# Patient Record
Sex: FEMALE | Race: WHITE | NOT HISPANIC OR LATINO | Employment: OTHER | URBAN - METROPOLITAN AREA
[De-identification: names, ages, dates, MRNs, and addresses within clinical notes are randomized per-mention and may not be internally consistent; named-entity substitution may affect disease eponyms.]

---

## 2017-02-06 ENCOUNTER — HISTORICAL (OUTPATIENT)
Dept: ADMINISTRATIVE | Facility: HOSPITAL | Age: 62
End: 2017-02-06

## 2017-04-04 ENCOUNTER — HISTORICAL (OUTPATIENT)
Dept: RADIOLOGY | Facility: HOSPITAL | Age: 62
End: 2017-04-04

## 2018-02-02 ENCOUNTER — HISTORICAL (OUTPATIENT)
Dept: ADMINISTRATIVE | Facility: HOSPITAL | Age: 63
End: 2018-02-02

## 2018-08-29 ENCOUNTER — TELEPHONE (OUTPATIENT)
Dept: GASTROENTEROLOGY | Facility: CLINIC | Age: 63
End: 2018-08-29

## 2018-08-29 NOTE — TELEPHONE ENCOUNTER
----- Message from Reina Jaramillo sent at 8/29/2018 10:55 AM CDT -----  Contact: self   sanjana - returning your call re her appts - did you get her records  - please call patient at

## 2018-08-29 NOTE — TELEPHONE ENCOUNTER
spoke with pt. haven't received records from  but will request today. Pt will also try and get the process going.

## 2018-08-31 ENCOUNTER — TELEPHONE (OUTPATIENT)
Dept: GASTROENTEROLOGY | Facility: CLINIC | Age: 63
End: 2018-08-31

## 2018-08-31 NOTE — TELEPHONE ENCOUNTER
----- Message from Reina Patelkert sent at 8/30/2018  1:45 PM CDT -----  Contact: self - [phone]  sanjana - did you receive her faxed medical records - please call patient at

## 2018-08-31 NOTE — TELEPHONE ENCOUNTER
Returned patient's call. Patient informed records received from Dr. Anne's office and Dr. Coburn's staff will contact her.    Patient thanked MA for call.

## 2018-09-20 ENCOUNTER — TELEPHONE (OUTPATIENT)
Dept: GASTROENTEROLOGY | Facility: CLINIC | Age: 63
End: 2018-09-20

## 2018-09-20 NOTE — TELEPHONE ENCOUNTER
Spoke with pt. Informed her that records are up for review by Pratibha Diallo NP. Will call back with appt time and date.

## 2018-09-20 NOTE — TELEPHONE ENCOUNTER
----- Message from Opal Correa sent at 9/19/2018  2:51 PM CDT -----  Contact: Self- [phone]  Almas- pt called to determine if she can be seen by Dr. Coburn for gerd and motility issues- please contact pt at [phone]

## 2018-10-09 ENCOUNTER — TELEPHONE (OUTPATIENT)
Dept: GASTROENTEROLOGY | Facility: CLINIC | Age: 63
End: 2018-10-09

## 2018-12-10 ENCOUNTER — TELEPHONE (OUTPATIENT)
Dept: GASTROENTEROLOGY | Facility: CLINIC | Age: 63
End: 2018-12-10

## 2018-12-10 ENCOUNTER — LAB VISIT (OUTPATIENT)
Dept: LAB | Facility: HOSPITAL | Age: 63
End: 2018-12-10
Payer: COMMERCIAL

## 2018-12-10 ENCOUNTER — OFFICE VISIT (OUTPATIENT)
Dept: GASTROENTEROLOGY | Facility: CLINIC | Age: 63
End: 2018-12-10
Payer: COMMERCIAL

## 2018-12-10 ENCOUNTER — TELEPHONE (OUTPATIENT)
Dept: ENDOSCOPY | Facility: HOSPITAL | Age: 63
End: 2018-12-10

## 2018-12-10 VITALS
BODY MASS INDEX: 29.06 KG/M2 | HEIGHT: 63 IN | WEIGHT: 164 LBS | HEART RATE: 74 BPM | DIASTOLIC BLOOD PRESSURE: 82 MMHG | SYSTOLIC BLOOD PRESSURE: 153 MMHG

## 2018-12-10 DIAGNOSIS — R10.84 ABDOMINAL PAIN, GENERALIZED: ICD-10-CM

## 2018-12-10 DIAGNOSIS — R14.0 BLOATING: ICD-10-CM

## 2018-12-10 DIAGNOSIS — K59.00 CONSTIPATION, UNSPECIFIED CONSTIPATION TYPE: ICD-10-CM

## 2018-12-10 DIAGNOSIS — K21.9 GASTROESOPHAGEAL REFLUX DISEASE WITHOUT ESOPHAGITIS: ICD-10-CM

## 2018-12-10 DIAGNOSIS — K59.00 CONSTIPATION, UNSPECIFIED CONSTIPATION TYPE: Primary | ICD-10-CM

## 2018-12-10 LAB
CREAT SERPL-MCNC: 0.7 MG/DL
EST. GFR  (AFRICAN AMERICAN): >60 ML/MIN/1.73 M^2
EST. GFR  (NON AFRICAN AMERICAN): >60 ML/MIN/1.73 M^2

## 2018-12-10 PROCEDURE — 99245 OFF/OP CONSLTJ NEW/EST HI 55: CPT | Mod: S$GLB,,, | Performed by: NURSE PRACTITIONER

## 2018-12-10 PROCEDURE — 82565 ASSAY OF CREATININE: CPT

## 2018-12-10 PROCEDURE — 81376 HLA II TYPING 1 LOCUS LR: CPT | Mod: 91

## 2018-12-10 PROCEDURE — 99999 PR PBB SHADOW E&M-EST. PATIENT-LVL V: CPT | Mod: PBBFAC,,, | Performed by: NURSE PRACTITIONER

## 2018-12-10 RX ORDER — AMITRIPTYLINE HYDROCHLORIDE 25 MG/1
TABLET, FILM COATED ORAL
Refills: 10 | COMMUNITY
Start: 2018-11-19

## 2018-12-10 RX ORDER — PANTOPRAZOLE SODIUM 40 MG/1
40 TABLET, DELAYED RELEASE ORAL
COMMUNITY
Start: 2017-07-10 | End: 2018-12-10

## 2018-12-10 RX ORDER — OMEPRAZOLE 40 MG/1
40 CAPSULE, DELAYED RELEASE ORAL
Qty: 180 CAPSULE | Refills: 3 | Status: SHIPPED | OUTPATIENT
Start: 2018-12-10 | End: 2019-02-27 | Stop reason: ALTCHOICE

## 2018-12-10 RX ORDER — ESTROGENS, CONJUGATED 0.62 MG/1
TABLET, FILM COATED ORAL
COMMUNITY
Start: 2018-11-12

## 2018-12-10 RX ORDER — LOSARTAN POTASSIUM AND HYDROCHLOROTHIAZIDE 25; 100 MG/1; MG/1
TABLET ORAL
COMMUNITY
Start: 2018-09-25 | End: 2019-09-17

## 2018-12-10 RX ORDER — VITAMIN E 268 MG
400 CAPSULE ORAL
COMMUNITY

## 2018-12-10 RX ORDER — CHLORPHENIRAMINE MALEATE 4 MG
4 TABLET ORAL EVERY 6 HOURS PRN
COMMUNITY

## 2018-12-10 RX ORDER — ATORVASTATIN CALCIUM 40 MG/1
40 TABLET, FILM COATED ORAL DAILY
COMMUNITY

## 2018-12-10 RX ORDER — FLUTICASONE PROPIONATE 50 MCG
1 SPRAY, SUSPENSION (ML) NASAL
COMMUNITY

## 2018-12-10 RX ORDER — MONTELUKAST SODIUM 10 MG/1
10 TABLET ORAL
COMMUNITY
Start: 2018-04-16

## 2018-12-10 NOTE — TELEPHONE ENCOUNTER
Please notes below....    Pratibha Diallo NP          12/10/18 12:07 PM   Note      GI procedures:     Day 1:  1st :anorectal manometry  2nd: MR defecography  3rd: EGD w/ BRAVO off PPI            Patient is scheduled for ARM on 1/21/19 at 1:30pm.  She can now be scheduled for MRI that same day after 3pm. (patient would like that afternoon on same day).    Her EGD/BRAVO off PPI is scheduled 2/14/19 in the first available BRAVO spot at 10:00am.    Thanks,  KORTNEY Velazquez

## 2018-12-10 NOTE — TELEPHONE ENCOUNTER
GI procedures:    Day 1:  1st :anorectal manometry  2nd: MR defecography  3rd: EGD w/ BRAVO off PPI

## 2018-12-10 NOTE — LETTER
December 11, 2018        Basil Anne MD  [address] [facility]  [address]  Gastroenetrology Clinic  [facility] [address]             [facility] Gastroenterology  [address]  [place] [address]  Phone: [phone]  Fax: [phone]   Patient: Joann López   MR Number: [id number]   YOB: 1955   Date of Visit: 12/10/2018       Dear Dr. Anne:    Thank you for referring Joann López to me for evaluation. Attached you will find relevant portions of my assessment and plan of care.    If you have questions, please do not hesitate to call me. I look forward to following Joann López along with you.    Sincerely,                  CC  No Recipients    Enclosure

## 2018-12-10 NOTE — PATIENT INSTRUCTIONS
Eliminate all forms of dairy/lactose (milk, cheese, butter, creamers, ice cream etc) and artificial sweeteners (splenda, equal, stevia, truvia, crystal lite, diet sodas, sugar free candy/gum etc) from your diet for 14 days to see if your symptoms improve.    For constipaiton: Stop herbal laxative and start trulance 3 mg daily. We have ordered an anorectal manometry and MRI defecography for further testing.    Start over the counter Iberogast for abdominal discomfort.     Stop pantoprazole and start omeprazole 40 mg twice daily for reflux. For further evaluation,we have ordered an EGD (upper endoscopy) with pH testing to be done off acid medication.

## 2018-12-10 NOTE — PROGRESS NOTES
[facility] Consultation Note    Reason for Consult:    Chief Complaint   Patient presents with    Heartburn    Abdominal Pain    Gas    Bloated    Constipation         PCP:   Primary Doctor No   [address] [facility] [address] GASTRO[facility] CL[facility]IC / [place]*    Referring MD:  Basil Anne Md  [address] [facility]  [address]  Gastroenetrology Clinic  [place] [address]      HPI:  Joann López is a 63 y.o. female with a PMH of asthma, allergies, HLD, HTN referred to motility clinic for second opinion regarding the following problems:    GERD.  Patient reports bothersome heartburn  Retrosternal pyrosis:yes  Regurgitation:yes  Belching:yes  Onset: 4 years  Frequency: daily  Improve with: no improvement with protonix 40 mg daily, zantac 150 mg 6 times daily, gas ex.  No improvement with prevacid, zegerid once daily in the past. nexium once daily lost efficacy. Dexilant 60 mg daily lost efficacy. Has not tried aciphex.      Upright symptoms: yes  Nocturnal symptoms: yes. Worse at night   Hoarseness:yes  Cough:no  Throat clearing:no  Food Triggers:no  Caffeine intake:1-2 cups coffee/day  Sleeps with head of the bed elevated.   Avoids eating prior to bedtime.     Dysphagia.  Reports difficulty swallowing.  Onset of symptoms:3 years ago  Problems with solids:yes  Problems with liquids:yes  Choking while eating:yes  Coughing while eating: yes  Location: upper esophagus  Frequency:  Was occurring 3-4 days weekly, until esophageal dilation (last dilation 2017)  Improves with:esophageal dilation. Usually lasts about 6 months.  History of food impactions requiring ED visit:no  History of allergies:no  History of seasonal allergies:yes  History of food allergies:wheat  History of eczema:no    Abdominal pain. Reports abdominal pain  Character:heavy, spasm  Location:epigastric, RUQ, RLQ  Frequency:  daily  Duration:several hours  Onset:3-4 years ago  Worse with:meals  Improves with:yoga, abd laser  treatments x 6-7 session per physical therapy, some improvement with librax in the past. IBgard lost efficacy. No improvement with elavil 12.5 mg HS x 3-4 months. Unable to tolerate 25 mg d/t worried it may have made constipation worse.  Associated with Bm: yes  Nocturnal pain: yes  Has not tried FDgard, Bentyl, Levsin, Levbid.  Antidepressants:celexa  Using narcotic pain medication: no   NSAIDs: no    Gas and bloating.   Bloating: yes  Excessive gas: yes  Abdominal distension: yes   Symptoms get worse after meals:yes  Symptoms get worse as the day progresses: sometimes   Consumes lactose:very little  Consumes artificial sugars:yes    Constipation.  Reports bothersome constipation:  Lumpy and hard, difficult to pass stools:yes. [place] type 1 off meds, type 4 on meds  Urgency and sensation of incomplete evacuation:yes  Straining during defecation:yes  Sensation of anorectal blockage:no  Rectal prolapse:no  Fewer than three spontaneous bowel movements per week: Once weekly if not on meds  Frequency:1 BM every other day  Symptoms started: reports IBS since having children (40 yrs ago), worse 4-5 years ago  Uses manual maneuvers to facilitate defecation:no    Problems in early childhood: no  History of perineal trauma: no    Previous evaluation: normal anorectal manometry  Previous pelvic muscle retraining: no    Unable to tolerate miralax due to bloating  Unable to tolerate Linzess 145 mcg x 1 year d/t diarrhea  No improvement with Amitiza 24 mcg BID  Some improvement with Trulance 3 mg daily at first, but then caused severe pressure in rectum, but worked better than linzess.  Some improvement  with dulcolax PRN in the past  Some improvement with Shaklee urbal laxative (contains senna) online 3 daily x 1 year  Has not tried lactulose, colace  Cannot recall efficacy of fiber     Anxiety. Depression. On celexa 20 mg daily with some improvement. Per pcp. Has not seen psych.    Mild Fatty liver on abd us.    Denies  dysphagia,  nausea, vomiting, early satiety,  diarrhea, constipation,  melena, weight loss,  insomnia.       Total visit time was 90 minutes, more than 50% of which was spent in face-to-face counseling with patient regarding symptoms, diagnostic results, prognosis, risks and benefits of treatment options, instructions for management, importance of compliance with chosen treatment options, risk factor reduction, stress reduction, coping strategies.      Previous Studies:   HIDA 7/3/18: nl EF 47%  Abd us limited 6/28/18:nl gallbladder. mild fatty liver.   Abd xray 2/2/18: nl  GES 7/18/17: initially slow but otherwise normal gastric emptying. The findings do not support gastroparesis. 86% ret at 1 hr, 67 % ret at 2 hrs, 32% ret at 3 hrs, 6% ret at 4 hrs.  * Anal manometry 6/1/17: Normal. Pt reports done at [facility]  Esophageal manometry 1/25/17: ineffective esophageal motility. frequent failed peristalsis w/ very large breaks. Min if any peristaltic activity in several swallows. IRP nl 4.5, DCI nl 777.1, 18% simultaneous contractions, 55% failed swallows  EGD 1/20/17: nl esophagus(min reflux changes) dilated with 56 fr bougie, gastric erythema (mild reactivity, mild chr gastritis). Nl duodenum (-).  Colon 6/18/15: FPTC. nl colon  EGD 5/21/15: nl esophagus (-) dilated w/ 50 fr easley, gastric erosions (erosive gastritis).   Per pt report, pH impedence 2015: + reflux correlated with s/s.  EGD 2/1/07: Nl esophagus. Gastric erythema (?). Nl duodenum (?).   Colon 2/1/07:no comment on prep. Complete to cecum. Nl colon  Flex sig 12/23/93: complete to mid TC. Melanosis coli.     Labs:  8/15/18: h. pylori stool Ag -  4/2017: CMP unremarkable, Vit D low 25, iron nl, b 12 nl, folate nl, TSH nl, CBC unremarkable  CARLOTTA +, speckled pattern, titer nl <1:40  scleroderma profile -  celiac disease panel -  IgA nl  T IgE high 367  IgG nl    *per referring provider note    ROS:  ROS   Constitutional: No fevers, + chills, + night  sweats, no weight loss  ENT: + congestion, no rhinorrhea, + chronic sinus problems  CV: No chest pain, no palpitations  Pulm: No cough, no shortness of breath  Ophtho: No blurry vision, no eye redness  GI: see HPI  Derm: No rash  Heme: No lymphadenopathy, no bruising  MSK: No joint pain, no joint swelling, no Raynauds  : No dysuria, no frequent urination, no blood in urine  Endo: No hot or cold intolerance  Neuro: No dizziness, no syncope, no seizure  Psych: No anxiety, no depression        Medical History:   Past Medical History:   Diagnosis Date    Asthma     Environmental allergies     HLD (hyperlipidemia)     HTN (hypertension)         Surgical History:   Past Surgical History:   Procedure Laterality Date     SECTION      OOPHORECTOMY      PARTIAL HYSTERECTOMY      TONSILLECTOMY      10 yrs ago        Family History:   Family History   Problem Relation Age of Onset    Celiac disease Mother     Irritable bowel syndrome Mother     Colon cancer Neg Hx     Crohn's disease Neg Hx     Esophageal cancer Neg Hx     Rectal cancer Neg Hx     Stomach cancer Neg Hx     Ulcerative colitis Neg Hx         Social History:   Social History     Socioeconomic History    Marital status: Unknown     Spouse name: None    Number of children: None    Years of education: None    Highest education level: None   Social Needs    Financial resource strain: None    Food insecurity - worry: None    Food insecurity - inability: None    Transportation needs - medical: None    Transportation needs - non-medical: None   Occupational History    None   Tobacco Use    Smoking status: Never Smoker    Smokeless tobacco: Never Used   Substance and Sexual Activity    Alcohol use: Yes     Comment: socially    Drug use: No    Sexual activity: None   Other Topics Concern    None   Social History Narrative    None        Review of patient's allergies indicates:  Allergies not on file    Current Outpatient  "Medications   Medication Sig Dispense Refill    amitriptyline (ELAVIL) 25 MG tablet TAKE ONE TABLET BY MOUTH AT BEDTIME AS DIRECTED  10    atorvastatin (LIPITOR) 40 MG tablet Take 40 mg by mouth once daily.      chlorpheniramine (CHLOR-TRIMETON) 4 mg tablet Take 4 mg by mouth every 6 (six) hours as needed for Allergies.      coQ10, ubiquinol, 200 mg Cap Take 200 mg by mouth.      fluticasone (FLONASE) 50 mcg/actuation nasal spray 1 spray by Nasal route.      losartan-hydrochlorothiazide 100-25 mg (HYZAAR) 100-25 mg per tablet       montelukast (SINGULAIR) 10 mg tablet Take 10 mg by mouth.      pantoprazole (PROTONIX) 40 MG tablet Take 40 mg by mouth.      PREMARIN 0.625 mg tablet       ranitidine (ZANTAC) 150 MG tablet TAKE ONE TABLET BY MOUTH EVERY EVENING      simethicone (GAS-X ORAL) Take by mouth.      UNABLE TO FIND medication name: Shaklee Herbal Laxative      UNABLE TO FIND medication name: Shaklee Probiotic Optiflora DI      UNABLE TO FIND medication name: Leslie Immunity NutriFuron      UNABLE TO FIND medication name: Shaklee Multi Vitamin      vitamin E 400 UNIT capsule Take 400 Units by mouth.      IBUPROFEN ORAL Take by mouth.       No current facility-administered medications for this visit.         Objective Findings:  Vital Signs:  BP (!) 153/82   Pulse 74   Ht 5' 3" (1.6 m)   Wt 74.4 kg (164 lb 0.4 oz)   BMI 29.06 kg/m²   Body mass index is 29.06 kg/m².    Physical Exam:  General appearance: alert, cooperative, no distress  HENT: Normocephalic, atraumatic, neck symmetrical, no nasal discharge  Eyes: conjunctivae/corneas clear, PERRL, EOM's intact  Lungs: clear to auscultation bilaterally, no dullness to percussion bilaterally  Heart: regular rate and rhythm without rub; no displacement of the PMI  Abdomen: soft, non-tender; bowel sounds normoactive; no organomegaly  Extremities: extremities symmetric; no clubbing, cyanosis, or edema  Integument: Skin color, texture, turgor " normal; no rashes; hair distrubution normal  Neurologic: Alert and oriented X 3, normal strength, normal coordination and gait  Psychiatric: no pressured speech; normal affect; no evidence of impaired cognition    RECTAL EXAM: possible mild dysfunction  Hemorrhoids: no  Tenderness: no  Skin tags: no  Fissure: no  Prolapse on [place]: no  No midline scar  Rectocele: none  SENSATION:  Normal sensation: yes  Anal reflex:normal  ANAL SPHINCTER   Normal resting tone: yes  Squeeze pressure: normal  BEAR DOWN:  Increased abdominal pressure: normal  Perineal descent: normal  Relaxation of EAS: no  Stool in volt: yes      Labs:  No results found for: WBC, HGB, HCT, MCV, PLT  No results found for: FERRITIN  No results found for: NA, K, CL, CO2, GLU, BUN, CREATININE, CALCIUM, PROT, ALBUMIN, BILITOT, ALKPHOS, AST, ALT  No results found for: TSH  No results found for: SEDRATE  No results found for: CRP  No results found for: LABA1C, HGBA1C        Assessment and Plan:  Joann López is a 63 y.o. female with a a PMH of asthma, allergies, HLD, HTN referred to motility clinic for second opinion regarding the following problems:    GERD.    No improvement with prevacid nor zegerid once daily in the past.   Nexium once daily lost efficacy.   Dexilant 60 mg daily lost efficacy.   No improvement with protonix 40 mg daily, zantac 150 mg 6 times daily, and gas ex PRN  -Stop protonix, only take zantac 150 mg twice daily  -Start omeprazole 40 mg BID  -EGD w E/g/d bx  W/ BRAVO off PPI  -Has not tried aciphex.          Dysphagia.  Esophageal manometry at [facility] with ineffective esophageal motility.  IgE elevated.    Resolved since last empiric esophageal dilation in 1/2017.  -Will consider repeat esophageal manometry if symptoms recur  -EGD w EoE bx     Abdominal pain. Associated with bowel movements. Nocturnal pain. Celiac panel negative.   On celexa  IBgard lost efficacy.   No improvement with elavil 12.5 mg HS. Unable to tolerate 25 mg  because it may have made constipation worse.  Some improvement with librax in the past.   Improves with yoga, abdominal laser treatments per physical therapy  -EGD  -Start Iberogast  -Consider increasing elavil as constipation improves  -Check HLA DQ 2/DQ8   -Has not tried FDgard, Bentyl, Levsin, Levbid.    Gas and bloating. w distention.   -Eliminate artificial sugars and lactose  -Will consider SIBO testing    Constipation.  PT reports IBS-C since having children (40 yrs ago), worse 4-5 years ago. Previous anorectal manometry normal. Rectal exam with evidence of pelvic floor dysfunction.   Cannot recall if fiber helped  Unable to tolerate miralax due to bloating  Unable to tolerate linzess 145 mcg due to diarrhea  No improvement with Amitiza 24 mcg daily  Some improvement  with dulcolax PRN in the past  Some improvement with Shaklee urbal laxative (senna)   Some improvement with trulance 3 mg daily, but caused rectal pressure.  -Anorectal manometry  -MR defecography  -Stop herbal laxative  -Start trulance 3 mg daily  -Will consider linzes 72mcg     Three female surgeries mentioned adhesions per Dr. Schwartz's note    Anxiety. Depression. On celexa 20 mg daily with some improvement. Per pcp. Has not seen psych.    Mild Fatty liver on abd us.    CARLOTTA+. Denies rheum sx     Follow-up in about 2 months (around 2/10/2019) for Motility w/ NAKUL Juarez, then Dr. Coburn in 4 months.    1. Constipation, unspecified constipation type    2. Gastroesophageal reflux disease without esophagitis    3. Abdominal pain, generalized    4. Bloating          Order summary:  Orders Placed This Encounter    MRI Pelvis With Contrast    Creatinine, serum    Case request GI: EGD (ESOPHAGOGASTRODUODENOSCOPY), PH MONITORING, ESOPHAGUS, WIRELESS, (OFF REFLUX MEDS)    Case request GI: MANOMETRY, ANORECTAL         Thank you so much for allowing me to participate in the care of Joann Diallo, APRN, FNP-C    I have  personally reviewed history, performed physical exam, and educated the patient.  I have reviewed and agree with today's findings and the care plan outlined by Pratibha Diallo NP.       Vivi Coburn MD

## 2018-12-11 ENCOUNTER — TELEPHONE (OUTPATIENT)
Dept: GASTROENTEROLOGY | Facility: CLINIC | Age: 63
End: 2018-12-11

## 2018-12-11 NOTE — TELEPHONE ENCOUNTER
----- Message from Lester Sadler MA sent at 12/11/2018  7:59 AM CST -----  Contact: self       ----- Message -----  From: Reina Jaramillo  Sent: 12/10/2018   1:08 PM  To: Sanjana GODFREY Staff    sanjana - returning your call re appts - please call patient at

## 2018-12-18 LAB
HLA CELIAC INTERPRETATION: ABNORMAL
HLA CELIAC SPECIMEN: ABNORMAL
HLA-DQ8 QL: NEGATIVE
HLA-DQA1*05:01 QL: POSITIVE
HLA-DQB1*02:01 QL: NEGATIVE

## 2018-12-19 ENCOUNTER — TELEPHONE (OUTPATIENT)
Dept: GASTROENTEROLOGY | Facility: CLINIC | Age: 63
End: 2018-12-19

## 2018-12-19 NOTE — TELEPHONE ENCOUNTER
Discussed celiac lab results with pt. Pt verbalized understanding. Pt thinks it is best she try gluten for the 8 weeks before having egd.

## 2019-01-06 ENCOUNTER — PATIENT MESSAGE (OUTPATIENT)
Dept: ADMINISTRATIVE | Facility: OTHER | Age: 64
End: 2019-01-06

## 2019-01-17 ENCOUNTER — TELEPHONE (OUTPATIENT)
Dept: GASTROENTEROLOGY | Facility: CLINIC | Age: 64
End: 2019-01-17

## 2019-01-17 NOTE — TELEPHONE ENCOUNTER
Spoke with pt and notified her that she is getting an MRI of her pelvis and that she needs to call radiology and ask them if her permeant eyeliner will affect her test.

## 2019-01-17 NOTE — TELEPHONE ENCOUNTER
"----- Message from Aileen Luis sent at 1/17/2019  2:40 PM CST -----  Contact: Patient   Patient has permanent eye liner and the notes on the MRI "What your doctor needs to know" says she should let the doctor know. She will also like to know if the Mri is for her esophagus       Callback: [phone]    Thank you   "

## 2019-01-21 ENCOUNTER — HOSPITAL ENCOUNTER (OUTPATIENT)
Facility: HOSPITAL | Age: 64
Discharge: HOME | End: 2019-01-21
Attending: INTERNAL MEDICINE | Admitting: INTERNAL MEDICINE
Payer: COMMERCIAL

## 2019-01-21 VITALS
OXYGEN SATURATION: 97 % | DIASTOLIC BLOOD PRESSURE: 72 MMHG | SYSTOLIC BLOOD PRESSURE: 160 MMHG | TEMPERATURE: 98 F | HEART RATE: 78 BPM | RESPIRATION RATE: 16 BRPM | HEIGHT: 63 IN | BODY MASS INDEX: 28.88 KG/M2 | WEIGHT: 163 LBS

## 2019-01-21 DIAGNOSIS — K59.00 CONSTIPATION: ICD-10-CM

## 2019-01-21 PROCEDURE — 91122 HC ANORECTAL MANOMETRY: CPT | Performed by: INTERNAL MEDICINE

## 2019-01-21 PROCEDURE — 91122 PR ANAL PRESSURE RECORD: CPT | Mod: 26,,, | Performed by: INTERNAL MEDICINE

## 2019-01-21 PROCEDURE — 91122 PR ANAL PRESSURE RECORD: ICD-10-PCS | Mod: 26,,, | Performed by: INTERNAL MEDICINE

## 2019-01-21 RX ORDER — CITALOPRAM 20 MG/1
20 TABLET, FILM COATED ORAL DAILY
COMMUNITY

## 2019-01-25 ENCOUNTER — TELEPHONE (OUTPATIENT)
Dept: GASTROENTEROLOGY | Facility: CLINIC | Age: 64
End: 2019-01-25

## 2019-01-25 NOTE — [employer] PATIENT INSTRUCTIONS
Discharge Summary/Instructions after an Endoscopic Procedure  Patient Name: Joann López  Patient MRN: [id number]  Patient YOB: 1955 Monday, January 21, 2019  Vivi Coburn MD  RESTRICTIONS:  During your procedure today, you received medications for sedation.  These   medications may affect your judgment, balance and coordination.  Therefore,   for 24 hours, you have the following restrictions:   - DO NOT drive a car, operate machinery, make legal/financial decisions,   sign important papers or drink alcohol.    ACTIVITY:  Today: no heavy lifting, straining or running due to procedural   sedation/anesthesia.  The following day: return to full activity including work.  DIET:  Eat and drink normally unless instructed otherwise.     TREATMENT FOR COMMON SIDE EFFECTS:  - Mild abdominal pain, nausea, belching, bloating or excessive gas:  rest,   eat lightly and use a heating pad.  - Sore Throat: treat with throat lozenges and/or gargle with warm salt   water.  - Because air was used during the procedure, expelling large amounts of air   from your rectum or belching is normal.  - If a bowel prep was taken, you may not have a bowel movement for 1-3 days.    This is normal.  SYMPTOMS TO WATCH FOR AND REPORT TO YOUR PHYSICIAN:  1. Abdominal pain or bloating, other than gas cramps.  2. Chest pain.  3. Back pain.  4. Signs of infection such as: chills or fever occurring within 24 hours   after the procedure.  5. Rectal bleeding, which would show as bright red, maroon, or black stools.   (A tablespoon of blood from the rectum is not serious, especially if   hemorrhoids are present.)  6. Vomiting.  7. Weakness or dizziness.  GO DIRECTLY TO THE NEAREST EMERGENCY ROOM IF YOU HAVE ANY OF THE FOLLOWING:      Difficulty breathing              Chills and/or fever over 101 F   Persistent vomiting and/or vomiting blood   Severe abdominal pain   Severe chest pain   Black, tarry stools   Bleeding- more than one  tablespoon   Any other symptom or condition that you feel may need urgent attention  Your doctor recommends these additional instructions:  If any biopsies were taken, your doctors clinic will contact you in 1 to 2   weeks with any results.  - Return to my office as previously scheduled.  For questions, problems or results please call your physician - Vivi Coburn MD at Work:  [phone].  [facility], EMERGENCY ROOM PHONE NUMBER: [phone]  IF A COMPLICATION OR EMERGENCY SITUATION ARISES AND YOU ARE UNABLE TO REACH   YOUR PHYSICIAN - GO DIRECTLY TO THE EMERGENCY ROOM.  Vivi Coburn MD  1/25/2019 5:35:08 PM  This report has been verified and signed electronically.  [employer]

## 2019-01-25 NOTE — TELEPHONE ENCOUNTER
Anorectal Manometry     High resting sphincter pressure  Adequate Squeeze  Dyssyngergic defecation Type IV   Hyposensitivity in the rectum  Patient was able to evacuate 50cc balloon.      Please let patient know that anorectal manometry shows:     Abnormal coordination of muscle during defecation   Hyposensensitivity of rectum       I would like to:   - Refer her to PT for biofeedback (the physical therapist will work with him/her to help improve the function of the rectum).  Pls arrange.  - needs to complete MR  Defecography.    Follow up with Dr. Coburn after all studies completed

## 2019-01-28 ENCOUNTER — HOSPITAL ENCOUNTER (OUTPATIENT)
Dept: RADIOLOGY | Facility: HOSPITAL | Age: 64
Discharge: HOME | End: 2019-01-28
Attending: NURSE PRACTITIONER
Payer: COMMERCIAL

## 2019-01-28 ENCOUNTER — TELEPHONE (OUTPATIENT)
Dept: GASTROENTEROLOGY | Facility: CLINIC | Age: 64
End: 2019-01-28

## 2019-01-28 ENCOUNTER — TELEPHONE (OUTPATIENT)
Dept: ENDOSCOPY | Facility: HOSPITAL | Age: 64
End: 2019-01-28

## 2019-01-28 DIAGNOSIS — K59.00 CONSTIPATION, UNSPECIFIED CONSTIPATION TYPE: ICD-10-CM

## 2019-01-28 DIAGNOSIS — M62.89 PELVIC FLOOR DYSFUNCTION: Primary | ICD-10-CM

## 2019-01-28 PROCEDURE — 72196 MRI PELVIS W/DYE: CPT | Mod: 26,,, | Performed by: RADIOLOGY

## 2019-01-28 PROCEDURE — 72196 MRI PELVIS W/DYE: CPT | Mod: TC

## 2019-01-28 PROCEDURE — 72196 MRI DEFECOGRAPHY: ICD-10-PCS | Mod: 26,,, | Performed by: RADIOLOGY

## 2019-01-28 NOTE — TELEPHONE ENCOUNTER
Pt states maybe the PT specialist is something that you can discuss w her because she feels that driving 3 hrs to come here is not ideal for her.

## 2019-01-28 NOTE — TELEPHONE ENCOUNTER
Pls offer to schedule a visit w PT on the same day as fu.  The pt specialist may be able to give us a recommendation for someone closer to her home.  Her ref Gi doctor may also know someone close to her house he/she may be able to ref to.

## 2019-01-28 NOTE — TELEPHONE ENCOUNTER
She should try to see one of our PT specialists that works on pelvic floor specifically.  Order placed

## 2019-01-28 NOTE — TELEPHONE ENCOUNTER
Anorectal manometry results given to pt w recommendations. Pt verbalized understanding. Pt asked that order be mailed to her to find local physical therapist. (I do not see the order.)    MR defecography completed today.    Pt wants to know if celiac labs should be repeated and if so , can the orders be placed to do same day as egd.

## 2019-01-28 NOTE — TELEPHONE ENCOUNTER
----- Message from Reina Jaramillo sent at 1/28/2019 10:32 AM CST -----  Contact: self   Needs Advice    Reason for call: returning your call re results        Communication Preference: [phone]    Additional Information:

## 2019-01-29 ENCOUNTER — TELEPHONE (OUTPATIENT)
Dept: GASTROENTEROLOGY | Facility: CLINIC | Age: 64
End: 2019-01-29

## 2019-01-29 NOTE — TELEPHONE ENCOUNTER
Attempted to contact PT here. Unable yo arrange same day as follow up due to their scheduling process. Informed that pt can communicate her preference to .    Informed pt of call and also of 's suggestions. Pt verbalized understanding.

## 2019-02-14 ENCOUNTER — ANESTHESIA EVENT (OUTPATIENT)
Dept: ENDOSCOPY | Facility: HOSPITAL | Age: 64
End: 2019-02-14
Payer: COMMERCIAL

## 2019-02-14 ENCOUNTER — ANESTHESIA (OUTPATIENT)
Dept: ENDOSCOPY | Facility: HOSPITAL | Age: 64
End: 2019-02-14
Payer: COMMERCIAL

## 2019-02-14 ENCOUNTER — HOSPITAL ENCOUNTER (OUTPATIENT)
Facility: HOSPITAL | Age: 64
Discharge: HOME | End: 2019-02-14
Attending: INTERNAL MEDICINE | Admitting: INTERNAL MEDICINE
Payer: COMMERCIAL

## 2019-02-14 VITALS
DIASTOLIC BLOOD PRESSURE: 60 MMHG | RESPIRATION RATE: 18 BRPM | TEMPERATURE: 98 F | SYSTOLIC BLOOD PRESSURE: 123 MMHG | HEIGHT: 63 IN | WEIGHT: 164 LBS | BODY MASS INDEX: 29.06 KG/M2 | HEART RATE: 65 BPM | OXYGEN SATURATION: 97 %

## 2019-02-14 DIAGNOSIS — K21.9 GERD (GASTROESOPHAGEAL REFLUX DISEASE): ICD-10-CM

## 2019-02-14 DIAGNOSIS — K21.9 GASTROESOPHAGEAL REFLUX DISEASE, ESOPHAGITIS PRESENCE NOT SPECIFIED: Primary | ICD-10-CM

## 2019-02-14 PROCEDURE — 88305 TISSUE EXAM BY PATHOLOGIST: CPT | Performed by: PATHOLOGY

## 2019-02-14 PROCEDURE — 37000008 HC ANESTHESIA 1ST 15 MINUTES: Performed by: INTERNAL MEDICINE

## 2019-02-14 PROCEDURE — 27202406 HC CATHETER (ANY), ENDO FUNCTNL LUM IMG PROBE TECH: Performed by: INTERNAL MEDICINE

## 2019-02-14 PROCEDURE — 63600175 PHARM REV CODE 636 W HCPCS: Performed by: NURSE ANESTHETIST, CERTIFIED REGISTERED

## 2019-02-14 PROCEDURE — 91040 ESOPH BALLOON DISTENSION TST: CPT | Performed by: INTERNAL MEDICINE

## 2019-02-14 PROCEDURE — 27200942: Performed by: INTERNAL MEDICINE

## 2019-02-14 PROCEDURE — 91035 G-ESOPH REFLX TST W/ELECTROD: CPT | Performed by: INTERNAL MEDICINE

## 2019-02-14 PROCEDURE — 25000003 PHARM REV CODE 250: Performed by: INTERNAL MEDICINE

## 2019-02-14 PROCEDURE — 91035 G-ESOPH REFLX TST W/ELECTROD: CPT | Mod: 26,,, | Performed by: INTERNAL MEDICINE

## 2019-02-14 PROCEDURE — E9220 PRA ENDO ANESTHESIA: ICD-10-PCS | Mod: ,,, | Performed by: NURSE ANESTHETIST, CERTIFIED REGISTERED

## 2019-02-14 PROCEDURE — 91035 PR GERD TST W/ MUCOS PH ELECTROD: ICD-10-PCS | Mod: 26,,, | Performed by: INTERNAL MEDICINE

## 2019-02-14 PROCEDURE — E9220 PRA ENDO ANESTHESIA: HCPCS | Mod: ,,, | Performed by: NURSE ANESTHETIST, CERTIFIED REGISTERED

## 2019-02-14 PROCEDURE — 91040 ESOPH BALLOON DISTENSION TST: CPT | Mod: 26,,, | Performed by: INTERNAL MEDICINE

## 2019-02-14 PROCEDURE — 91040 PR ESOPH BALLOON DISTENSION TST: ICD-10-PCS | Mod: 26,,, | Performed by: INTERNAL MEDICINE

## 2019-02-14 PROCEDURE — 43239 EGD BIOPSY SINGLE/MULTIPLE: CPT | Mod: ,,, | Performed by: INTERNAL MEDICINE

## 2019-02-14 PROCEDURE — 27201012 HC FORCEPS, HOT/COLD, DISP: Performed by: INTERNAL MEDICINE

## 2019-02-14 PROCEDURE — 88305 TISSUE SPECIMEN TO PATHOLOGY - SURGERY: ICD-10-PCS | Mod: 26,,, | Performed by: PATHOLOGY

## 2019-02-14 PROCEDURE — 37000009 HC ANESTHESIA EA ADD 15 MINS: Performed by: INTERNAL MEDICINE

## 2019-02-14 PROCEDURE — 43239 EGD BIOPSY SINGLE/MULTIPLE: CPT | Performed by: INTERNAL MEDICINE

## 2019-02-14 PROCEDURE — 88305 TISSUE EXAM BY PATHOLOGIST: CPT | Mod: 26,,, | Performed by: PATHOLOGY

## 2019-02-14 PROCEDURE — 43239 PR EGD, FLEX, W/BIOPSY, SGL/MULTI: ICD-10-PCS | Mod: ,,, | Performed by: INTERNAL MEDICINE

## 2019-02-14 RX ORDER — PROPOFOL 10 MG/ML
VIAL (ML) INTRAVENOUS CONTINUOUS PRN
Status: DISCONTINUED | OUTPATIENT
Start: 2019-02-14 | End: 2019-02-15

## 2019-02-14 RX ORDER — PROPOFOL 10 MG/ML
VIAL (ML) INTRAVENOUS
Status: DISCONTINUED | OUTPATIENT
Start: 2019-02-14 | End: 2019-02-15

## 2019-02-14 RX ORDER — SODIUM CHLORIDE 9 MG/ML
INJECTION, SOLUTION INTRAVENOUS CONTINUOUS
Status: DISCONTINUED | OUTPATIENT
Start: 2019-02-14 | End: 2019-02-14 | Stop reason: HOSPADM

## 2019-02-14 RX ORDER — LIDOCAINE HCL/PF 100 MG/5ML
SYRINGE (ML) INTRAVENOUS
Status: DISCONTINUED | OUTPATIENT
Start: 2019-02-14 | End: 2019-02-15

## 2019-02-14 RX ORDER — SODIUM CHLORIDE 0.9 % (FLUSH) 0.9 %
3 SYRINGE (ML) INJECTION
Status: DISCONTINUED | OUTPATIENT
Start: 2019-02-14 | End: 2019-02-14 | Stop reason: HOSPADM

## 2019-02-14 RX ADMIN — LIDOCAINE HYDROCHLORIDE 50 MG: 20 INJECTION, SOLUTION INTRAVENOUS at 10:02

## 2019-02-14 RX ADMIN — PROPOFOL 80 MG: 10 INJECTION, EMULSION INTRAVENOUS at 10:02

## 2019-02-14 RX ADMIN — SODIUM CHLORIDE: 0.9 INJECTION, SOLUTION INTRAVENOUS at 09:02

## 2019-02-14 RX ADMIN — PROPOFOL 200 MCG/KG/MIN: 10 INJECTION, EMULSION INTRAVENOUS at 10:02

## 2019-02-14 NOTE — ANESTHESIA PREPROCEDURE EVALUATION
02/14/2019  Joann López is a 63 y.o., female with GERD here for EGD and pH monitoring.    Past Medical History:   Diagnosis Date    Asthma     Environmental allergies     HLD (hyperlipidemia)     HTN (hypertension)      No results found for: WBC, HGB, HCT, MCV, PLT    Chemistry        Component Value Date/Time    CREATININE 0.7 12/10/2018 1300        Component Value Date/Time    ESTGFRAFRICA >60.0 12/10/2018 1300    EGFRNONAA >60.0 12/10/2018 1300            Anesthesia Evaluation    I have reviewed the Patient Summary Reports.     I have reviewed the Medications.     Review of Systems  Anesthesia Hx:  No problems with previous Anesthesia    Cardiovascular:   Exercise tolerance: good Hypertension Denies CABG/stent.   Denies Angina.    Pulmonary:   Denies Shortness of breath.    Hepatic/GI:   GERD, poorly controlled        Physical Exam  General:  Well nourished, Obesity    Airway/Jaw/Neck:  Airway Findings: Mouth Opening: Normal Tongue: Normal  General Airway Assessment: Adult  Mallampati: II  Jaw/Neck Findings:  Neck ROM: Normal ROM      Dental:  Dental Findings: In tact   Chest/Lungs:  Chest/Lungs Findings: Clear to auscultation, Normal Respiratory Rate     Heart/Vascular:  Heart Findings: Rate: Normal  Rhythm: Regular Rhythm  Sounds: Normal        Mental Status:  Mental Status Findings:  Cooperative, Alert and Oriented         Anesthesia Plan  Type of Anesthesia, risks & benefits discussed:  Anesthesia Type:  general  Patient's Preference:   Intra-op Monitoring Plan: standard ASA monitors  Intra-op Monitoring Plan Comments:   Post Op Pain Control Plan: multimodal analgesia  Post Op Pain Control Plan Comments:   Induction:   IV  Beta Blocker:         Informed Consent: Patient understands risks and agrees with Anesthesia plan.  Questions answered. Anesthesia consent signed with patient.  ASA Score: 2      Day of Surgery Review of History & Physical:    H&P update referred to the surgeon.         Ready For Surgery From Anesthesia Perspective.

## 2019-02-14 NOTE — DISCHARGE INSTRUCTIONS

## 2019-02-14 NOTE — H&P
Short Stay Endoscopy History and Physical    PCP - Primary Doctor No     Procedure - EGD  ASA - per anesthesia  Mallampati - per anesthesia  History of Anesthesia problems - no  Family history Anesthesia problems -  no   Plan of anesthesia - General    HPI:  This is a 63 y.o. female here for evaluation of gerd, dysphagia, abd pain:     ROS:  Constitutional: No fevers, chills, No weight loss  CV: No chest pain  Pulm: No cough, No shortness of breath  Ophtho: No vision changes  GI: see HPI  Derm: No rash    Medical History:  has a past medical history of Asthma, Environmental allergies, HLD (hyperlipidemia), and HTN (hypertension).    Surgical History:  has a past surgical history that includes Tonsillectomy; Partial hysterectomy;  section; Oophorectomy; Anorectal manometry (N/A, 2019); and Hysterectomy.    Family History: family history includes Celiac disease in her mother; Irritable bowel syndrome in her mother.. Otherwise no colon cancer, inflammatory bowel disease, or GI malignancies.    Social History:  reports that  has never smoked. she has never used smokeless tobacco. She reports that she drinks alcohol. She reports that she does not use drugs.    Review of patient's allergies indicates:  No Known Allergies    Medications:   Medications Prior to Admission   Medication Sig Dispense Refill Last Dose    amitriptyline (ELAVIL) 25 MG tablet TAKE ONE TABLET BY MOUTH AT BEDTIME AS DIRECTED  10 2019 at Unknown time    atorvastatin (LIPITOR) 40 MG tablet Take 40 mg by mouth once daily.   2019 at Unknown time    chlorpheniramine (CHLOR-TRIMETON) 4 mg tablet Take 4 mg by mouth every 6 (six) hours as needed for Allergies.   Past Week at Unknown time    citalopram (CELEXA) 20 MG tablet Take 20 mg by mouth once daily.   2019 at Unknown time    coQ10, ubiquinol, 200 mg Cap Take 200 mg by mouth.   2019 at Unknown time    fluticasone (FLONASE) 50 mcg/actuation nasal spray 1 spray by  Nasal route.   Past Week at Unknown time    IBUPROFEN ORAL Take by mouth.   Past Week at Unknown time    losartan-hydrochlorothiazide 100-25 mg (HYZAAR) 100-25 mg per tablet    2/14/2019 at Unknown time    montelukast (SINGULAIR) 10 mg tablet Take 10 mg by mouth.   2/13/2019 at Unknown time    omeprazole (PRILOSEC) 40 MG capsule Take 1 capsule (40 mg total) by mouth 2 (two) times daily before meals. 180 capsule 3 Past Week at Unknown time    plecanatide (TRULANCE) 3 mg Tab Take 3 mg by mouth once daily. 30 tablet 6 2/13/2019 at Unknown time    PREMARIN 0.625 mg tablet    2/13/2019 at Unknown time    ranitidine (ZANTAC) 150 MG tablet TAKE ONE TABLET BY MOUTH EVERY EVENING   Past Week at Unknown time    simethicone (GAS-X ORAL) Take by mouth.   Past Week at Unknown time    vitamin E 400 UNIT capsule Take 400 Units by mouth.   2/13/2019 at Unknown time    UNABLE TO FIND medication name: Shaklee Herbal Laxative   Taking    UNABLE TO FIND medication name: Shaklee Probiotic Optiflora DI   Taking    UNABLE TO FIND medication name: Mattkelleann Immunity NutriFuron   Taking    UNABLE TO FIND medication name: Shaklee Multi Vitamin          Physical Exam:    Vital Signs:   Vitals:    02/14/19 0913   BP: (!) 146/67   Pulse: 70   Resp: 16   Temp: 98.2 °F (36.8 °C)       General Appearance: Well appearing in no acute distress  Eyes:    No scleral icterus  Lungs: CTA anteriorly  Heart:  Regular rate, S1, S2 normal, no murmurs heard.  Abdomen: Soft, non tender, non distended with normal bowel sounds. No hepatosplenomegaly, ascites, or mass.  Extremities: No edema  Skin: No rash    Labs:  Lab Results   Component Value Date    CREATININE 0.7 12/10/2018       I have explained the risks and benefits of endoscopy procedures to the patient including but not limited to bleeding, perforation, infection, and death.      Vivi Coburn MD

## 2019-02-14 NOTE — [employer] PATIENT INSTRUCTIONS
Discharge Summary/Instructions after an Endoscopic Procedure  Patient Name: Joann López  Patient MRN: [id number]  Patient YOB: 1955 Thursday, February 14, 2019  Vivi Coburn MD  RESTRICTIONS:  During your procedure today, you received medications for sedation.  These   medications may affect your judgment, balance and coordination.  Therefore,   for 24 hours, you have the following restrictions:   - DO NOT drive a car, operate machinery, make legal/financial decisions,   sign important papers or drink alcohol.    ACTIVITY:  Today: no heavy lifting, straining or running due to procedural   sedation/anesthesia.  The following day: return to full activity including work.  DIET:  Eat and drink normally unless instructed otherwise.     TREATMENT FOR COMMON SIDE EFFECTS:  - Mild abdominal pain, nausea, belching, bloating or excessive gas:  rest,   eat lightly and use a heating pad.  - Sore Throat: treat with throat lozenges and/or gargle with warm salt   water.  - Because air was used during the procedure, expelling large amounts of air   from your rectum or belching is normal.  - If a bowel prep was taken, you may not have a bowel movement for 1-3 days.    This is normal.  SYMPTOMS TO WATCH FOR AND REPORT TO YOUR PHYSICIAN:  1. Abdominal pain or bloating, other than gas cramps.  2. Chest pain.  3. Back pain.  4. Signs of infection such as: chills or fever occurring within 24 hours   after the procedure.  5. Rectal bleeding, which would show as bright red, maroon, or black stools.   (A tablespoon of blood from the rectum is not serious, especially if   hemorrhoids are present.)  6. Vomiting.  7. Weakness or dizziness.  GO DIRECTLY TO THE NEAREST EMERGENCY ROOM IF YOU HAVE ANY OF THE FOLLOWING:      Difficulty breathing              Chills and/or fever over 101 F   Persistent vomiting and/or vomiting blood   Severe abdominal pain   Severe chest pain   Black, tarry stools   Bleeding- more than one  tablespoon   Any other symptom or condition that you feel may need urgent attention  Your doctor recommends these additional instructions:  If any biopsies were taken, your doctors clinic will contact you in 1 to 2   weeks with any results.  - Await pathology results.   - Discharge patient to home (with escort).   - Resume previous diet.   - Continue present medications.   - Return to my office as previously scheduled.   - The findings and recommendations were discussed with the patient.   - Patient has a contact number available for emergencies.  The signs and   symptoms of potential delayed complications were discussed with the   patient.  Return to normal activities tomorrow.  Written discharge   instructions were provided to the patient.   For questions, problems or results please call your physician - Vivi Coburn MD at Work:  [phone].  [facility], EMERGENCY ROOM PHONE NUMBER: [phone]  IF A COMPLICATION OR EMERGENCY SITUATION ARISES AND YOU ARE UNABLE TO REACH   YOUR PHYSICIAN - GO DIRECTLY TO THE EMERGENCY ROOM.  Vivi Coburn MD  2/14/2019 11:19:48 AM  This report has been verified and signed electronically.  [employer]

## 2019-02-15 NOTE — ANESTHESIA POSTPROCEDURE EVALUATION
"Anesthesia Post Evaluation    Patient: Joann López    Procedure(s) Performed: Procedure(s) (LRB):  EGD (ESOPHAGOGASTRODUODENOSCOPY) (N/A)  PH MONITORING, ESOPHAGUS, WIRELESS, (OFF REFLUX MEDS) (N/A)    Final Anesthesia Type: general  Patient location during evaluation: PACU  Patient participation: Yes- Able to Participate  Level of consciousness: awake and alert and oriented  Post-procedure vital signs: reviewed and stable  Pain management: adequate  Airway patency: patent  PONV status at discharge: No PONV  Anesthetic complications: no      Cardiovascular status: blood pressure returned to baseline and hemodynamically stable  Respiratory status: unassisted and spontaneous ventilation  Hydration status: euvolemic  Follow-up not needed.        Visit Vitals  /60   Pulse 65   Temp 36.6 °C (97.9 °F)   Resp 18   Ht 5' 3" (1.6 m)   Wt 74.4 kg (164 lb)   SpO2 97%   Breastfeeding? No   BMI 29.05 kg/m²       Pain/Olinda Score: Olinda Score: 10 (2/14/2019 11:50 AM)        "

## 2019-02-15 NOTE — TRANSFER OF CARE
"Anesthesia Transfer of Care Note    Patient: Joann López    Procedure(s) Performed: Procedure(s) (LRB):  EGD (ESOPHAGOGASTRODUODENOSCOPY) (N/A)  PH MONITORING, ESOPHAGUS, WIRELESS, (OFF REFLUX MEDS) (N/A)    Patient location: GI    Anesthesia Type: general    Transport from OR: Transported from OR on room air with adequate spontaneous ventilation    Post pain: adequate analgesia    Post assessment: no apparent anesthetic complications and tolerated procedure well    Post vital signs: stable    Level of consciousness: awake, oriented and alert    Nausea/Vomiting: no nausea/vomiting    Complications: none    Transfer of care protocol was followed      Last vitals:   Visit Vitals  /60   Pulse 65   Temp 36.6 °C (97.9 °F)   Resp 18   Ht 5' 3" (1.6 m)   Wt 74.4 kg (164 lb)   SpO2 97%   Breastfeeding? No   BMI 29.05 kg/m²     "

## 2019-02-19 ENCOUNTER — TELEPHONE (OUTPATIENT)
Dept: GASTROENTEROLOGY | Facility: CLINIC | Age: 64
End: 2019-02-19

## 2019-02-19 NOTE — PROVATION PATIENT INSTRUCTIONS
Discharge Summary/Instructions after an Endoscopic Procedure  Patient Name: Joann López  Patient MRN: [id number]  Patient YOB: 1955 Thursday, February 14, 2019  Vivi Coburn MD  RESTRICTIONS:  During your procedure today, you received medications for sedation.  These   medications may affect your judgment, balance and coordination.  Therefore,   for 24 hours, you have the following restrictions:   - DO NOT drive a car, operate machinery, make legal/financial decisions,   sign important papers or drink alcohol.    ACTIVITY:  Today: no heavy lifting, straining or running due to procedural   sedation/anesthesia.  The following day: return to full activity including work.  DIET:  Eat and drink normally unless instructed otherwise.     TREATMENT FOR COMMON SIDE EFFECTS:  - Mild abdominal pain, nausea, belching, bloating or excessive gas:  rest,   eat lightly and use a heating pad.  - Sore Throat: treat with throat lozenges and/or gargle with warm salt   water.  - Because air was used during the procedure, expelling large amounts of air   from your rectum or belching is normal.  - If a bowel prep was taken, you may not have a bowel movement for 1-3 days.    This is normal.  SYMPTOMS TO WATCH FOR AND REPORT TO YOUR PHYSICIAN:  1. Abdominal pain or bloating, other than gas cramps.  2. Chest pain.  3. Back pain.  4. Signs of infection such as: chills or fever occurring within 24 hours   after the procedure.  5. Rectal bleeding, which would show as bright red, maroon, or black stools.   (A tablespoon of blood from the rectum is not serious, especially if   hemorrhoids are present.)  6. Vomiting.  7. Weakness or dizziness.  GO DIRECTLY TO THE NEAREST EMERGENCY ROOM IF YOU HAVE ANY OF THE FOLLOWING:      Difficulty breathing              Chills and/or fever over 101 F   Persistent vomiting and/or vomiting blood   Severe abdominal pain   Severe chest pain   Black, tarry stools   Bleeding- more than one  tablespoon   Any other symptom or condition that you feel may need urgent attention  Your doctor recommends these additional instructions:  If any biopsies were taken, your doctors clinic will contact you in 1 to 2   weeks with any results.  - Return to my office as previously scheduled.  For questions, problems or results please call your physician - Vivi Coburn MD at Work:  [phone].  [facility], EMERGENCY ROOM PHONE NUMBER: [phone]  IF A COMPLICATION OR EMERGENCY SITUATION ARISES AND YOU ARE UNABLE TO REACH   YOUR PHYSICIAN - GO DIRECTLY TO THE EMERGENCY ROOM.  Vivi Coburn MD  2/19/2019 9:06:02 AM  This report has been verified and signed electronically.  [employer]

## 2019-02-19 NOTE — TELEPHONE ENCOUNTER
Bravo results given to pt along w recommendations. Pt states she has been doing all recommendations provided and she will keep at it. Fu is w TT,NP on 2/27 at 11am, should this be changed?

## 2019-02-19 NOTE — TELEPHONE ENCOUNTER
BRAVO Results:  Significant acid reflux off medication, especially in recumbent position.   Total percent time pH less than 4 (< 4.5; <1 if PPI BID): 5.8  Percent time pH less than 4 Upright (<8.4; <1.5 if PPI BID): 6.7  Percent time pH less than 4 Recumbent (<3.5; 0.5 if PPI BID): 4.6  DeMeester Score (<14.7): 21.2  Negative SI  Positive SAP for chest pain and belch.      Please let patient know that the BRAVO shows  Acid reflux, especially at night    Recommendation:  Continue PPI   Follow up with Dr. Coburn after all studies completed   Please ask pt to work on preventing reflux at night by trying the following:  -Elevation of the head of the bed.  This can be achieved either by putting six- to eight-inch blocks under the legs at the head of the bed or a Styrofoam wedge under the mattress.   -She can also consider buying [employer] Sleep Device that will keep her sleeping on left side, which improves reflux at night  -She should refrain from laying down after meals  -She should avoid meals two to three hours before bedtime

## 2019-02-21 ENCOUNTER — TELEPHONE (OUTPATIENT)
Dept: ENDOSCOPY | Facility: HOSPITAL | Age: 64
End: 2019-02-21

## 2019-02-27 ENCOUNTER — TELEPHONE (OUTPATIENT)
Dept: GASTROENTEROLOGY | Facility: CLINIC | Age: 64
End: 2019-02-27

## 2019-02-27 ENCOUNTER — HOSPITAL ENCOUNTER (OUTPATIENT)
Dept: CARDIOLOGY | Facility: CLINIC | Age: 64
Discharge: HOME | End: 2019-02-27
Payer: COMMERCIAL

## 2019-02-27 ENCOUNTER — OFFICE VISIT (OUTPATIENT)
Dept: GASTROENTEROLOGY | Facility: CLINIC | Age: 64
End: 2019-02-27
Payer: COMMERCIAL

## 2019-02-27 VITALS
BODY MASS INDEX: 29.3 KG/M2 | DIASTOLIC BLOOD PRESSURE: 76 MMHG | HEIGHT: 63 IN | WEIGHT: 165.38 LBS | HEART RATE: 72 BPM | SYSTOLIC BLOOD PRESSURE: 128 MMHG

## 2019-02-27 DIAGNOSIS — R10.9 ABDOMINAL PAIN, UNSPECIFIED ABDOMINAL LOCATION: ICD-10-CM

## 2019-02-27 DIAGNOSIS — R14.0 BLOATING: ICD-10-CM

## 2019-02-27 DIAGNOSIS — K59.09 CONSTIPATION, CHRONIC: ICD-10-CM

## 2019-02-27 DIAGNOSIS — Z51.81 THERAPEUTIC DRUG MONITORING: ICD-10-CM

## 2019-02-27 DIAGNOSIS — K21.9 GASTROESOPHAGEAL REFLUX DISEASE WITHOUT ESOPHAGITIS: Primary | ICD-10-CM

## 2019-02-27 PROCEDURE — 93000 EKG 12-LEAD: ICD-10-PCS | Mod: S$GLB,,, | Performed by: INTERNAL MEDICINE

## 2019-02-27 PROCEDURE — 99999 PR PBB SHADOW E&M-EST. PATIENT-LVL IV: CPT | Mod: PBBFAC,,, | Performed by: NURSE PRACTITIONER

## 2019-02-27 PROCEDURE — 3008F PR BODY MASS INDEX (BMI) DOCUMENTED: ICD-10-PCS | Mod: CPTII,S$GLB,, | Performed by: NURSE PRACTITIONER

## 2019-02-27 PROCEDURE — 3008F BODY MASS INDEX DOCD: CPT | Mod: CPTII,S$GLB,, | Performed by: NURSE PRACTITIONER

## 2019-02-27 PROCEDURE — 99999 PR PBB SHADOW E&M-EST. PATIENT-LVL IV: ICD-10-PCS | Mod: PBBFAC,,, | Performed by: NURSE PRACTITIONER

## 2019-02-27 PROCEDURE — 93000 ELECTROCARDIOGRAM COMPLETE: CPT | Mod: S$GLB,,, | Performed by: INTERNAL MEDICINE

## 2019-02-27 PROCEDURE — 99215 OFFICE O/P EST HI 40 MIN: CPT | Mod: S$GLB,,, | Performed by: NURSE PRACTITIONER

## 2019-02-27 PROCEDURE — 99215 PR OFFICE/OUTPT VISIT, EST, LEVL V, 40-54 MIN: ICD-10-PCS | Mod: S$GLB,,, | Performed by: NURSE PRACTITIONER

## 2019-02-27 RX ORDER — RABEPRAZOLE SODIUM 20 MG/1
20 TABLET, DELAYED RELEASE ORAL 2 TIMES DAILY
Qty: 180 TABLET | Refills: 3 | Status: SHIPPED | OUTPATIENT
Start: 2019-02-27 | End: 2020-02-27

## 2019-02-27 NOTE — PATIENT INSTRUCTIONS
Stop omeprazole and xantac and start aciphex twice daily and over the counter gaviscon with alginate (order on line) 1-4 times daily as needed for reflux.    We will check an EKG and if okay, we will instruct you to increase elavil to 25 mg nightly for abdominal pain/GI hypersensitivity.    We have ordered a breath test for bacterial overgrowth to see if this can be causing bloating.    Stop trulance since it causes diarrhea and start linzess 72 mcg 30 minutes before breakfast daily.     Proceed with pelvic floor muscle retraining with the physical therapist.

## 2019-02-27 NOTE — TELEPHONE ENCOUNTER
----- Message from Sylvia Santiago sent at 2/27/2019  3:41 PM CST -----  Contact: self [phone]  Needs Advice    Reason for call: Pt called inquiring about the things that was discuss today at the appt         Communication Preference: self [phone]    Additional Information:

## 2019-02-27 NOTE — PROGRESS NOTES
[facility] Consultation Note    Reason for Consult:    Chief Complaint   Patient presents with    Heartburn    Abdominal Pain    Gas    Bloated         PCP:   Primary Doctor No       Referring MD:    Dr. Anne    HPI:  Joann López is a 63 y.o. female with a PMH of asthma, allergies, HLD, HTN referred to motility clinic for second opinion regarding the following problems:    GERD.  No improvement with prilosec 40 mg BID and zantac 150 mg bid.  Patient reports bothersome heartburn  Retrosternal pyrosis:yes  Regurgitation:yes  Belching:yes  Onset: 4 years  Frequency: daily  Improve with: no improvement with protonix 40 mg daily, zantac 150 mg 6 times daily, gas ex.  No improvement with prevacid, zegerid once daily in the past. nexium once daily lost efficacy. Dexilant 60 mg daily lost efficacy. Has not tried aciphex.      Upright symptoms: yes  Nocturnal symptoms: yes. Worse at night   Hoarseness:yes  Cough:no  Throat clearing:no  Food Triggers:no  Caffeine intake:1-2 cups coffee/day  Sleeps with head of the bed elevated.   Avoids eating prior to bedtime.     Dysphagia.  No problems.   Onset of symptoms:3 years ago  Problems with solids:yes  Problems with liquids:yes  Choking while eating:yes  Coughing while eating: yes  Location: upper esophagus  Frequency:  Was occurring 3-4 days weekly, until esophageal dilation (last dilation 2017)  Improves with:esophageal dilation. Usually lasts about 6 months.  History of food impactions requiring ED visit:no  History of allergies:no  History of seasonal allergies:yes  History of food allergies:wheat  History of eczema:no    Abdominal pain. Reports abdominal pain. Some improvement. Not occurring as often.  Character:heavy, spasm  Location:epigastric, RUQ, RLQ  Frequency:  4 days weekly as opposed to daily   Duration:several hours  Onset:3-4 years ago  Worse with:meals  Improves with:yoga, abd laser treatments x 6-7 session per physical  therapy, some improvement with librax in the past. IBgard lost efficacy. No improvement with elavil 12.5 mg HS x 3-4 months. Unable to tolerate 25 mg d/t worried it may have made constipation worse. No improvement with iberogast.  Associated with Bm: yes  Nocturnal pain: yes, cannot sleep on right side  Has not tried FDgard, Bentyl, Levsin, Levbid.  Antidepressants:celexa  Using narcotic pain medication: no   NSAIDs: no    Gas and bloating. Some improvement, but still occuring. Has eliminated diary(apart from small amt of butter on occ.)  and artificial sugars  Bloating: yes  Excessive gas: yes  Abdominal distension: yes   Symptoms get worse after meals:yes  Symptoms get worse as the day progresses: sometimes     Constipation.  Reports bothersome constipation. Some improvement with trulance 3 mg daily. With 3-4 loose stools/daily 4-5 days weekly and pencil thin stools maybe two days weekly.   Lumpy and hard, difficult to pass stools:yes. Rice type 1 off meds  Urgency and sensation of incomplete evacuation:yes  Straining during defecation:yes  Sensation of anorectal blockage:no  Rectal prolapse:no  Fewer than three spontaneous bowel movements per week: Once weekly if not on meds  Frequency:1 BM every other day  Symptoms started: reports IBS since having children (40 yrs ago), worse 4-5 years ago  Uses manual maneuvers to facilitate defecation:no    Problems in early childhood: no  History of perineal trauma: no    Previous evaluation: normal anorectal manometry  Previous pelvic muscle retraining: no    Unable to tolerate miralax due to bloating  Unable to tolerate Linzess 145 mcg x 1 year d/t diarrhea  No improvement with Amitiza 24 mcg BID  Some improvement with Trulance 3 mg daily at first, but then caused severe pressure in rectum, but worked better than linzess.  Some improvement  with dulcolax PRN in the past  Some improvement with Shaklee urbal laxative (contains senna) online 3 daily x 1 year  Has not tried  lactulose, colace  Cannot recall efficacy of fiber     Was seen by pelvic floor PT and was referred to urogyn, which recommended no surgery and back to pelvic floor PT, so will be starting with pelvic floor muscle retraining this Monday.    Anxiety. Depression. On celexa 20 mg daily with some improvement. Per pcp. Has not seen psych.    Mild Fatty liver on abd us.    Denies dysphagia,  nausea, vomiting, early satiety,  diarrhea, constipation,  melena, weight loss,  insomnia.       Total visit time was 40 minutes, more than 50% of which was spent in face-to-face counseling with patient regarding symptoms, diagnostic results, prognosis, risks and benefits of treatment options, instructions for management, importance of compliance with chosen treatment options, risk factor reduction, stress reduction, coping strategies.      Previous Studies:   BRAVO off meds 2/14/19: Significant acid reflux off medication, especially                         in recumbent position.                        Total percent time pH less than 4 (< 4.5; <1 if PPI                         BID): 5.8                        Percent time pH less than 4 Upright (<8.4; <1.5 if                         PPI BID): 6.7                        Percent time pH less than 4 Recumbent (<3.5; 0.5 if                         PPI BID): 4.6                        DeMeester Score (<14.7): 21.2                        Negative SI                        Positive SAP for chest pain and belch.  EGD 2/14/19: nl esophagus (-). BRAVO deployed. endoflip impedance with normal EJG distensibility and possible intermittent spasm of lower esophagus. 2 cm HH. Nl stomach (-). Gastric polyps (FGPs). Nl duodenum (-).   MRI defecography 1/28/19: Grade 1-2 (mild-to-moderate) cystocele with urethral hypermobility. Minimal grade 1 vaginal descent . Grade 2 (moderate) rectocele.  Abnormal widening and descent of the levator hiatus and anorectal junction during defecation.  ARM 1/21/19: High  resting sphincter pressure  Adequate Squeeze  Dyssyngergic defecation Type IV   Hyposensitivity in the rectum  Patient was able to evacuate 50cc balloon.  HIDA 7/3/18: nl EF 47%  Abd us limited 6/28/18:nl gallbladder. mild fatty liver.   Abd xray 2/2/18: nl  GES 7/18/17: initially slow but otherwise normal gastric emptying. The findings do not support gastroparesis. 86% ret at 1 hr, 67 % ret at 2 hrs, 32% ret at 3 hrs, 6% ret at 4 hrs.  * Anal manometry 6/1/17: Normal. Pt reports done at [facility]  Esophageal manometry 1/25/17: ineffective esophageal motility. frequent failed peristalsis w/ very large breaks. Min if any peristaltic activity in several swallows. IRP nl 4.5, DCI nl 777.1, 18% simultaneous contractions, 55% failed swallows  EGD 1/20/17: nl esophagus(min reflux changes) dilated with 56 fr bougie, gastric erythema (mild reactivity, mild chr gastritis). Nl duodenum (-).  Colon 6/18/15: FPTC. nl colon  EGD 5/21/15: nl esophagus (-) dilated w/ 50 fr easley, gastric erosions (erosive gastritis).   Per pt report, pH impedence 2015: + reflux correlated with s/s.  EGD 2/1/07: Nl esophagus. Gastric erythema (?). Nl duodenum (?).   Colon 2/1/07:no comment on prep. Complete to cecum. Nl colon  Flex sig 12/23/93: complete to mid TC. Melanosis coli.     Labs:  8/15/18: h. pylori stool Ag -  4/2017: CMP unremarkable, Vit D low 25, iron nl, b 12 nl, folate nl, TSH nl, CBC unremarkable  CARLOTTA +, speckled pattern, titer nl <1:40  scleroderma profile -  celiac disease panel -  IgA nl  T IgE high 367  IgG nl    *per referring provider note    ROS:  ROS   Constitutional: No fevers, no chills, no night sweats, no weight loss  ENT: no congestion, no rhinorrhea, no chronic sinus problems  CV: No chest pain, no palpitations  Pulm: No cough, no shortness of breath  Ophtho: No blurry vision, no eye redness  GI: see HPI  Derm: No rash  Heme: No lymphadenopathy, no bruising  MSK: No joint pain, no joint swelling, no  Raynauds  : No dysuria, no frequent urination, no blood in urine  Endo: No hot or cold intolerance  Neuro: No dizziness, no syncope, no seizure  Psych: No anxiety, no depression        Medical History:   Past Medical History:   Diagnosis Date    Asthma     Environmental allergies     HLD (hyperlipidemia)     HTN (hypertension)         Surgical History:   Past Surgical History:   Procedure Laterality Date     SECTION      EGD (ESOPHAGOGASTRODUODENOSCOPY) N/A 2019    Performed by [facility]    HYSTERECTOMY      MANOMETRY, ANORECTAL N/A 2019    Performed [facility] at [facility] (4TH FLR)    OOPHORECTOMY      PARTIAL HYSTERECTOMY      PH MONITORING, ESOPHAGUS, WIRELESS, (OFF REFLUX MEDS) N/A 2019    Performed by Viv[facility]urn [facility] at [facility] ENDO (4TH FLR)    TONSILLECTOMY      10 yrs ago        Family History:   Family History   Problem Relation Age of Onset    Celiac disease Mother     Irritable bowel syndrome Mother     Colon cancer Neg Hx     Crohn's disease Neg Hx     Esophageal cancer Neg Hx     Rectal cancer Neg Hx     Stomach cancer Neg Hx     Ulcerative colitis Neg Hx         Social History:   Social History     Socioeconomic History    Marital status: Unknown     Spouse name: None    Number of children: None    Years of education: None    Highest education level: None   Social Needs    Financial resource strain: None    Food insecurity - worry: None    Food insecurity - inability: None    Transportation needs - medical: None    Transportation needs - non-medical: None   Occupational History    None   Tobacco Use    Smoking status: Never Smoker    Smokeless tobacco: Never Used   Substance and Sexual Activity    Alcohol use: Yes     Comment: socially    Drug use: No    Sexual activity: None   Other Topics Concern    None   Social History Narrative    None        Review of patient's allergies  "indicates:  Allergies not on file    Current Outpatient Medications   Medication Sig Dispense Refill    amitriptyline (ELAVIL) 25 MG tablet TAKE ONE TABLET BY MOUTH AT BEDTIME AS DIRECTED  10    atorvastatin (LIPITOR) 40 MG tablet Take 40 mg by mouth once daily.      chlorpheniramine (CHLOR-TRIMETON) 4 mg tablet Take 4 mg by mouth every 6 (six) hours as needed for Allergies.      citalopram (CELEXA) 20 MG tablet Take 20 mg by mouth once daily.      coQ10, ubiquinol, 200 mg Cap Take 200 mg by mouth.      fluticasone (FLONASE) 50 mcg/actuation nasal spray 1 spray by Nasal route.      IBUPROFEN ORAL Take by mouth.      losartan-hydrochlorothiazide 100-25 mg (HYZAAR) 100-25 mg per tablet       montelukast (SINGULAIR) 10 mg tablet Take 10 mg by mouth.      PREMARIN 0.625 mg tablet       simethicone (GAS-X ORAL) Take by mouth.      vitamin E 400 UNIT capsule Take 400 Units by mouth.      linaclotide (LINZESS) 72 mcg Cap Take 1 capsule (72 mcg total) by mouth once daily. 30 capsule 6    RABEprazole (ACIPHEX) 20 mg tablet Take 1 tablet (20 mg total) by mouth 2 (two) times daily. 180 tablet 3     No current facility-administered medications for this visit.         Objective Findings:  Vital Signs:  /76   Pulse 72   Ht 5' 3" (1.6 m)   Wt 75 kg (165 lb 5.5 oz)   BMI 29.29 kg/m²   Body mass index is 29.29 kg/m².    Physical Exam:  General appearance: alert, cooperative, no distress  HENT: Normocephalic, atraumatic, neck symmetrical, no nasal discharge  Eyes: conjunctivae/corneas clear, PERRL, EOM's intact  Lungs: clear to auscultation bilaterally, no dullness to percussion bilaterally  Heart: regular rate and rhythm without rub; no displacement of the PMI  Abdomen: soft, non-tender; bowel sounds normoactive; no organomegaly  Extremities: extremities symmetric; no clubbing, cyanosis, or edema  Integument: Skin color, texture, turgor normal; no rashes; hair distrubution normal  Neurologic: Alert and " oriented X 3, normal strength, normal coordination and gait  Psychiatric: no pressured speech; normal affect; no evidence of impaired cognition      Labs:  No results found for: WBC, HGB, HCT, MCV, PLT  No results found for: FERRITIN  Lab Results   Component Value Date    CREATININE 0.7 12/10/2018     No results found for: TSH  No results found for: SEDRATE  No results found for: CRP  No results found for: LABA1C, HGBA1C        Assessment and Plan:  Joann López is a 63 y.o. female with a a PMH of asthma, allergies, HLD, HTN referred to motility clinic for second opinion regarding the following problems:    GERD.  BRAVO with evidence of reflux with good symptom correlation.  No improvement with prevacid nor zegerid once daily in the past.   Nexium once daily lost efficacy.   Dexilant 60 mg daily lost efficacy.   No improvement with protonix 40 mg daily, zantac 150 mg 6 times daily, and gas ex PRN  No improvement with omeprazole 40 mg BID and zantac 150 mg twice daily  -Stop omeprazole and zantac.  -Start Aciphex BID and gaviscon with alginate 1-4 times daily.  -PT would like to consider nissen fundoplication if no relief with the above          Dysphagia.  Esophageal manometry at [facility] with ineffective esophageal motility.  IgE elevated.  EGD with possible lower esophageal spsams.  Resolved since last empiric esophageal dilation in 1/2017.  -Will consider repeat esophageal manometry if symptoms recur    Abdominal pain. Associated with bowel movements. Nocturnal pain. HLA DQ2/DQ8 +, Celiac panel negative. EGD w duodenal bx negative.  On celexa  IBgard lost efficacy.   No improvement with elavil 12.5 mg HS. Unable to tolerate 25 mg because it may have made constipation worse.  No improvement with iberogast.  Some improvement with librax in the past.   Improves with yoga, abdominal laser treatments per physical therapy  -Check EKG, if okay, increase elavil to 25 mg HS.   -Has not tried FDgard, Bentyl, Levsin,  Levbid.    Gas and bloating. w distention.   Mild improvement with eliminating lactose and artificial sugars  -Check SIBO testing    Constipation.  PT reports IBS-C since having children (40 yrs ago), worse 4-5 years ago. recent anorectal manometry normal with pelvic floor dysfunction. MRI defecography with evidence of pelvic floor dysfunction, rectocele,  Cystocele and grade 1 vaginal descent  Cannot recall if fiber helped  Unable to tolerate miralax due to bloating  Unable to tolerate linzess 145 mcg due to diarrhea  No improvement with Amitiza 24 mcg daily  Some improvement  with dulcolax PRN in the past  Some improvement with Shaklee urbal laxative (senna)  In the past  Some improvement with trulance 3 mg daily, but causes diarrhea  -Stop trulance  -Start linzess 72mcg daily  -Proceed with pelvic floor PT as previously planned.    MRI defecography with Cystocele and grade 1 vaginal descent.  Seen by urogyn; surgery deemed unnecessary  -Pelvic floor PT advised.    Three female surgeries mentioned adhesions per Dr. Schwartz's note    Anxiety. Depression. On celexa 20 mg daily and elavil 12.5 mg daily with some improvement. Per pcp. Has not seen psych.    Mild Fatty liver on abd us.    CARLOTTA+. Denies rheum sx     Follow-up in about 2 months (around 4/27/2019) for Motility w Dr. Coburn.    1. Gastroesophageal reflux disease without esophagitis    2. Constipation, chronic    3. Therapeutic drug monitoring    4. Bloating    5. Abdominal pain, unspecified abdominal location          Order summary:  Orders Placed This Encounter    EKG 12-lead    linaclotide (LINZESS) 72 mcg Cap    RABEprazole (ACIPHEX) 20 mg tablet         Thank you so much for allowing me to participate in the care of Joann López          Pratibha Diallo, APRN, FNP-C

## 2019-03-03 ENCOUNTER — PATIENT MESSAGE (OUTPATIENT)
Dept: GASTROENTEROLOGY | Facility: CLINIC | Age: 64
End: 2019-03-03

## 2019-03-07 ENCOUNTER — TELEPHONE (OUTPATIENT)
Dept: GASTROENTEROLOGY | Facility: CLINIC | Age: 64
End: 2019-03-07

## 2019-03-07 NOTE — TELEPHONE ENCOUNTER
Pt has no PCP listed. Call  Pt and left vm asking the name of her PCP and asked to call back or message with name.

## 2019-03-07 NOTE — TELEPHONE ENCOUNTER
----- Message from Pratibha Diallo NP sent at 3/7/2019  3:45 PM CST -----  Please forward EKG to PCP.    Thanks,  Evf, NP

## 2019-03-08 ENCOUNTER — TELEPHONE (OUTPATIENT)
Dept: GASTROENTEROLOGY | Facility: CLINIC | Age: 64
End: 2019-03-08

## 2019-03-08 ENCOUNTER — PATIENT MESSAGE (OUTPATIENT)
Dept: GASTROENTEROLOGY | Facility: CLINIC | Age: 64
End: 2019-03-08

## 2019-03-08 NOTE — TELEPHONE ENCOUNTER
Spoke with pt about linzess. Pt says she is having chronic diarrhea with linzess and would like some recommendations as to what to do. Also she wanted to let Pratibha and Dr. Coburn know she has started pelvic floor therapy and the therapist says it will help with her constipation.

## 2019-03-08 NOTE — TELEPHONE ENCOUNTER
Called Dr. Dillan Mckeon office but is was closed. Will call again Monday to get fax to send over EKG of pt.  [phone]

## 2019-03-08 NOTE — TELEPHONE ENCOUNTER
QTC borderline   I agree.  Ask her to fu w PCP re borderline QTC so that he/she can review meds and decide if anything needs to be changes and monitors it long term.  She can try FDguard

## 2019-03-08 NOTE — TELEPHONE ENCOUNTER
Have her take linzess 72 mcg every other day instead of daily.    Also, please inform her I s/w Dr. Coburn about her EKG results and she should definitely discuss the results with her PCP (EKG shows T wave abnormalities and borderline QTc. She should discuss reviewing her medications with pcp and decide if anything needs to be changed based on the borderline QTc, because certain meds can prolong the QTc and lead to heart problems. Ensure the pcp monitors this for her. We should not increase the elavil since due to the EKG findings listed above. She should try FDgard for the abdominal discomfort.   Thanks,  Tiff, NP

## 2019-03-08 NOTE — TELEPHONE ENCOUNTER
----- Message from Sylvia Santiago sent at 3/8/2019 11:39 AM CST -----  Contact: self [phone]  Needs Advice    Reason for call: Pt called in regarding linaclotide (LINZESS) 72 mcg Cap         Communication Preference: self [phone]    Additional Information:

## 2019-03-11 ENCOUNTER — TELEPHONE (OUTPATIENT)
Dept: GASTROENTEROLOGY | Facility: CLINIC | Age: 64
End: 2019-03-11

## 2019-03-21 ENCOUNTER — TELEPHONE (OUTPATIENT)
Dept: GASTROENTEROLOGY | Facility: CLINIC | Age: 64
End: 2019-03-21

## 2019-05-16 ENCOUNTER — TELEPHONE (OUTPATIENT)
Dept: GASTROENTEROLOGY | Facility: CLINIC | Age: 64
End: 2019-05-16

## 2019-06-18 ENCOUNTER — OFFICE VISIT (OUTPATIENT)
Dept: GASTROENTEROLOGY | Facility: CLINIC | Age: 64
End: 2019-06-18
Payer: COMMERCIAL

## 2019-06-18 VITALS
HEIGHT: 63 IN | DIASTOLIC BLOOD PRESSURE: 73 MMHG | HEART RATE: 58 BPM | SYSTOLIC BLOOD PRESSURE: 141 MMHG | WEIGHT: 169.31 LBS | BODY MASS INDEX: 30 KG/M2

## 2019-06-18 DIAGNOSIS — R10.9 ABDOMINAL PAIN, UNSPECIFIED ABDOMINAL LOCATION: ICD-10-CM

## 2019-06-18 DIAGNOSIS — K59.00 CONSTIPATION, UNSPECIFIED CONSTIPATION TYPE: ICD-10-CM

## 2019-06-18 DIAGNOSIS — K58.9 IRRITABLE BOWEL SYNDROME, UNSPECIFIED TYPE: ICD-10-CM

## 2019-06-18 DIAGNOSIS — K21.9 GASTROESOPHAGEAL REFLUX DISEASE, ESOPHAGITIS PRESENCE NOT SPECIFIED: Primary | ICD-10-CM

## 2019-06-18 DIAGNOSIS — R14.0 BLOATING: ICD-10-CM

## 2019-06-18 PROCEDURE — 99999 PR PBB SHADOW E&M-EST. PATIENT-LVL III: CPT | Mod: PBBFAC,,, | Performed by: INTERNAL MEDICINE

## 2019-06-18 PROCEDURE — 99215 OFFICE O/P EST HI 40 MIN: CPT | Mod: S$GLB,,, | Performed by: INTERNAL MEDICINE

## 2019-06-18 PROCEDURE — 99215 PR OFFICE/OUTPT VISIT, EST, LEVL V, 40-54 MIN: ICD-10-PCS | Mod: S$GLB,,, | Performed by: INTERNAL MEDICINE

## 2019-06-18 PROCEDURE — 99999 PR PBB SHADOW E&M-EST. PATIENT-LVL III: ICD-10-PCS | Mod: PBBFAC,,, | Performed by: INTERNAL MEDICINE

## 2019-06-18 PROCEDURE — 3008F BODY MASS INDEX DOCD: CPT | Mod: CPTII,S$GLB,, | Performed by: INTERNAL MEDICINE

## 2019-06-18 PROCEDURE — 3008F PR BODY MASS INDEX (BMI) DOCUMENTED: ICD-10-PCS | Mod: CPTII,S$GLB,, | Performed by: INTERNAL MEDICINE

## 2019-06-18 RX ORDER — ROSUVASTATIN CALCIUM 20 MG/1
20 TABLET, COATED ORAL DAILY
COMMUNITY

## 2019-06-18 RX ORDER — BISOPROLOL FUMARATE AND HYDROCHLOROTHIAZIDE 10; 6.25 MG/1; MG/1
1 TABLET ORAL DAILY
COMMUNITY

## 2019-06-18 NOTE — PROGRESS NOTES
[facility] Consultation Note    Reason for Consult:    Chief Complaint   Patient presents with    Heartburn    Dysphagia    Gas    Bloated         PCP:   Primary Doctor No       Referring MD:  Basil Anne Md  [address]  [address]  [place] [address]    HPI:  Joann López is a 64 y.o. female with a PMH of asthma, allergies, HLD, HTN referred to motility clinic for second opinion regarding the following problems:    GERD.  Fair control. 4/7 days good.  Wakes up w regurgitation.   Worse in the evening     Takes aciphex 20mg QD to BID  Gaviscon is helping     Still w lots of belching     Dysphagia.  No longer a problems.   Onset of symptoms:3 years ago  Problems with solids:yes  Problems with liquids:yes  Location: upper esophagus  Resolved w dialation     Abdominal pain. No longer having Improved w linzess/elavil  Character:heavy, spasm  Location:epigastric, RUQ, RLQ    Gas and bloating. Still having it  Did not complete SIBO breath test   Avoids lactose and artificial sugars, which makes a big difference     Constipation. Significantly Improved.   Chalmers 4  Daily BMs  Completed 10 weeks of PT BID w significant improvement in symptoms  MEDs: Linzess 72mg EOD, gets diarrhea if taken daily, Mg w uyen supplement     Seen by urogyn, which recommended no surgery and back to pelvic floor PT    Anxiety. No depression. Improved. On celexa 20mg , but takes 10 mg daily with some improvement. Per pcp. Has not seen psych.    Insomnia. Resolved w elavil     Mild Fatty liver on abd us.    EKG.  QTC prolonged. Seen by cardiology. Told that has irregular heart beat.  Started on bisoprolol.  Primary Gi increased elavil to 50mg.  Pt decreased it to 25mg bc of weight gain. Felt that elavil was making her feel much better.     Denies dysphagia,  nausea, vomiting, early satiety, diarrhea, melena, weight loss, insomnia.       Total visit time was 40 minutes, more than  50% of which was spent in face-to-face counseling with patient regarding symptoms, diagnostic results, prognosis, risks and benefits of treatment options, instructions for management, importance of compliance with chosen treatment options, risk factor reduction, stress reduction, coping strategies.      Previous Studies:   BRAVO off meds 2/14/19: Significant acid reflux off medication, especially in recumbent position. Total percent time pH less than 4 (< 4.5; <1 if PPI BID): 5.8.  Percent time pH less than 4 Upright (<8.4; <1.5 if PPI BID): 6.7.  Percent time pH less than 4 Recumbent (<3.5; 0.5 if PPI BID): 4.6.  DeMeester Score (<14.7): 21.2.  Negative SI.  Positive SAP for chest pain and belch.  EGD 2/14/19: nl esophagus (-). BRAVO deployed. endoflip impedance with normal EJG distensibility and possible intermittent spasm of lower esophagus. 2 cm HH. Nl stomach (-). Gastric polyps (FGPs). Nl duodenum (-).   MRI defecography 1/28/19: Grade 1-2 (mild-to-moderate) cystocele with urethral hypermobility. Minimal grade 1 vaginal descent . Grade 2 (moderate) rectocele.  Abnormal widening and descent of the levator hiatus and anorectal junction during defecation.  ARM 1/21/19: High resting sphincter pressure.  Adequate Squeeze. Dyssyngergic defecation Type IV. Hyposensitivity in the rectum. Patient was able to evacuate 50cc balloon.  HIDA 7/3/18: nl EF 47%  Abd us limited 6/28/18:nl gallbladder. mild fatty liver.   Abd xray 2/2/18: nl  GES 7/18/17: initially slow but otherwise normal gastric emptying. The findings do not support gastroparesis. 86% ret at 1 hr, 67 % ret at 2 hrs, 32% ret at 3 hrs, 6% ret at 4 hrs.  * Anal manometry 6/1/17: Normal. Pt reports done at [facility]  Esophageal manometry 1/25/17: ineffective esophageal motility. frequent failed peristalsis w/ very large breaks. Min if any peristaltic activity in several swallows. IRP nl 4.5, DCI nl 777.1, 18% simultaneous contractions, 55% failed  swallows  EGD 17: nl esophagus(min reflux changes) dilated with 56 fr bougie, gastric erythema (mild reactivity, mild chr gastritis). Nl duodenum (-).  Colon 6/18/15: FPTC. nl colon  EGD 5/21/15: nl esophagus (-) dilated w/ 50 fr easley, gastric erosions (erosive gastritis).   Per pt report, pH impedence : + reflux correlated with s/s.  EGD 07: Nl esophagus. Gastric erythema (?). Nl duodenum (?).   Colon 07:no comment on prep. Complete to cecum. Nl colon  Flex sig 93: complete to mid TC. Melanosis coli.     Labs:  8/15/18: h. pylori stool Ag -  2017: CMP unremarkable, Vit D low 25, iron nl, b 12 nl, folate nl, TSH nl, CBC unremarkable  CARLOTTA +, speckled pattern, titer nl <1:40  scleroderma profile -  celiac disease panel -  IgA nl  T IgE high 367  IgG nl    *per referring provider note    ROS:  ROS   Constitutional: No fevers, no chills, no night sweats, no weight loss  ENT: no congestion, + rhinorrhea, + chronic sinus problems  CV: No chest pain, no palpitations  Pulm: No cough, no shortness of breath  Ophtho: No blurry vision, no eye redness  GI: see HPI  Derm: No rash  Heme: No lymphadenopathy, no bruising  MSK: + joint pain, no joint swelling, no Raynauds  : No dysuria, no frequent urination, no blood in urine  Endo: No hot or cold intolerance  Neuro: No dizziness, no syncope, no seizure  Psych: No anxiety, no depression        Medical History:   Past Medical History:   Diagnosis Date    Asthma     Environmental allergies     HLD (hyperlipidemia)     HTN (hypertension)         Surgical History:   Past Surgical History:   Procedure Laterality Date     SECTION      EGD (ESOPHAGOGASTRODUODENOSCOPY) N/A 2019    [facility]urn [facility] ([facility])    HYSTERECTOMY      MANOMETRY, ANORECTAL N/A 2019    [facility] ([facility] F[facility])    OOPHORECTOMY      PARTIAL HYSTERECTOMY      PH MONITORING, ESOPHAGUS, WIRELESS, (OFF  REFLUX MEDS) N/A 2/14/2019    [facility])    TONSILLECTOMY      10 yrs ago        Family History:   Family History   Problem Relation Age of Onset    Celiac disease Mother     Irritable bowel syndrome Mother     Colon cancer Neg Hx     Crohn's disease Neg Hx     Esophageal cancer Neg Hx     Rectal cancer Neg Hx     Stomach cancer Neg Hx     Ulcerative colitis Neg Hx         Social History:   Social History     Socioeconomic History    Marital status: Unknown     Spouse name: Not on file    Number of children: Not on file    Years of education: Not on file    Highest education level: Not on file   Occupational History    Not on file   Social Needs    Financial resource strain: Not on file    Food insecurity:     Worry: Not on file     Inability: Not on file    Transportation needs:     Medical: Not on file     Non-medical: Not on file   Tobacco Use    Smoking status: Never Smoker    Smokeless tobacco: Never Used   Substance and Sexual Activity    Alcohol use: Yes     Comment: socially    Drug use: No    Sexual activity: Not on file   Lifestyle    Physical activity:     Days per week: Not on file     Minutes per session: Not on file    Stress: Not on file   Relationships    Social connections:     Talks on phone: Not on file     Gets together: Not on file     Attends Anabaptist service: Not on file     Active member of club or organization: Not on file     Attends meetings of clubs or organizations: Not on file     Relationship status: Not on file   Other Topics Concern    Not on file   Social History Narrative    Not on file        Review of patient's allergies indicates:  Allergies not on file    Current Outpatient Medications   Medication Sig Dispense Refill    amitriptyline (ELAVIL) 25 MG tablet TAKE ONE TABLET BY MOUTH AT BEDTIME AS DIRECTED  10    atorvastatin (LIPITOR) 40 MG tablet Take 40 mg by mouth once daily.       "bisoprolol-hydrochlorothiazide 5-6.25 mg (ZIAC) 5-6.25 mg Tab Take 1 tablet by mouth once daily.      chlorpheniramine (CHLOR-TRIMETON) 4 mg tablet Take 4 mg by mouth every 6 (six) hours as needed for Allergies.      citalopram (CELEXA) 20 MG tablet Take 20 mg by mouth once daily.      coQ10, ubiquinol, 200 mg Cap Take 200 mg by mouth.      fluticasone (FLONASE) 50 mcg/actuation nasal spray 1 spray by Nasal route.      IBUPROFEN ORAL Take by mouth.      linaclotide (LINZESS) 72 mcg Cap Take 1 capsule (72 mcg total) by mouth once daily. 30 capsule 6    losartan-hydrochlorothiazide 100-25 mg (HYZAAR) 100-25 mg per tablet       montelukast (SINGULAIR) 10 mg tablet Take 10 mg by mouth.      PREMARIN 0.625 mg tablet       RABEprazole (ACIPHEX) 20 mg tablet Take 1 tablet (20 mg total) by mouth 2 (two) times daily. 180 tablet 3    rosuvastatin (CRESTOR) 20 MG tablet Take 20 mg by mouth once daily.      simethicone (GAS-X ORAL) Take by mouth.      vitamin E 400 UNIT capsule Take 400 Units by mouth.       No current facility-administered medications for this visit.         Objective Findings:  Vital Signs:  BP (!) 141/73   Pulse (!) 58   Ht 5' 3" (1.6 m)   Wt 76.8 kg (169 lb 5 oz)   BMI 29.99 kg/m²   Body mass index is 29.99 kg/m².    Physical Exam:  General appearance: alert, cooperative, no distress  HENT: Normocephalic, atraumatic, neck symmetrical, no nasal discharge  Eyes: conjunctivae/corneas clear, PERRL, EOM's intact  Lungs: clear to auscultation bilaterally, no dullness to percussion bilaterally  Heart: regular rate and rhythm without rub; no displacement of the PMI  Abdomen: soft, non-tender; bowel sounds normoactive; no organomegaly  Extremities: extremities symmetric; no clubbing, cyanosis, or edema  Integument: Skin color, texture, turgor normal; no rashes; hair distrubution normal  Neurologic: Alert and oriented X 3, normal strength, normal coordination and gait  Psychiatric: no pressured " speech; normal affect; no evidence of impaired cognition      Labs:  No results found for: WBC, HGB, HCT, MCV, PLT  No results found for: FERRITIN  Lab Results   Component Value Date    CREATININE 0.7 12/10/2018     No results found for: TSH  No results found for: SEDRATE  No results found for: CRP  No results found for: LABA1C, HGBA1C        Assessment and Plan:  Joann López is a 64 y.o. female with a a PMH of asthma, allergies, HLD, HTN referred to motility clinic for second opinion regarding the following problems:    GERD.  BRAVO w significant acid reflux off medication, especially in recumbent position.  Poor symptom correlation  No improvement with prevacid nor zegerid once daily in the past.   Nexium once daily lost efficacy.   Dexilant 60 mg daily lost efficacy.   No improvement with protonix 40 mg daily, zantac 150 mg 6 times daily, and gas ex PRN  No improvement with omeprazole 40 mg BID and zantac 150 mg twice daily  -Aciphex BID and gaviscon with alginate 1-4 times daily prn.  -Clinical presentation overall highly suggestive of visceral hypersensitivity.  Restart celexa 20mg and elavil 50 if Ok w cardiology.   -Discussed pathophysiology, presentation and treatment of GERD including surgical options. PT hesitant to take PPIs long term and  would like to consider nissen fundoplication if no relief with the above.  We discussed that pt would be at increased risk of gas/bloat syndrome and dysphagia.   -Discussed GERD lifestyle precautions       Dysphagia - resolved.  Esophageal manometry at [facility] with ineffective esophageal motility. EGD negative. Endoflip with normal distensibility and possible lower esophageal spsams.  Resolved since last empiric esophageal dilation in 1/2017.  -Will consider repeat esophageal manometry if symptoms recur or if decides to proceed w antireflux surgery    Abdominal pain associated with bowel movements. Previously w nocturnal pain. IBS-C.  Now minimal.     IBgard lost  efficacy.   No improvement with iberogast.  Some improvement with librax in the past.   Some improvement with eliminating lactose and artificial sugars  Improved with yoga, elavil, pelvic physical therapy  -Restart celexa 20mg and elavil 50 if Ok w cardiology.   -Has not tried FDgard, Bentyl, Levsin, Levbid.    Gas and bloating. w distention.   Some improvement with eliminating lactose and artificial sugars  -Discussed the pathophysiology and treatment of gas and bloating.  Provided handout  -Check SIBO testing  -Restart celexa 20mg and elavil 50 if Ok w cardiology.   -Will consider Iberogast again     Constipation.  PT reports IBS-C since having children (40 yrs ago), worse 4-5 years ago.  Recent anorectal manometry with high resting sphincter pressure, dyssyngergic defecation Type IV, hyposensitivity, able to evacuate 50cc balloon.  MRI defecography with evidence of pelvic floor dysfunction, rectocele.    Cannot recall if fiber helped  Unable to tolerate miralax due to bloating  Unable to tolerate linzess 145 mcg due to diarrhea  No improvement with Amitiza 24 mcg daily  Some improvement  with dulcolax PRN in the past  Some improvement with Shaklee urbal laxative (senna)  In the past  Some improvement with trulance 3 mg daily, but causes diarrhea  Significant improvement with pelvic floor PT (comeplted 10 sessions)  -Take linzess 72mcg daily - after breakfast (has diarrhea if taken prior to breakfast)  - Will consider amitiza 8mg BID if no improvement w above     MRI defecography with Cystocele and grade 1 vaginal descent.  Seen by urogyn; surgery deemed unnecessary  Significant improvement with pelvic floor PT (comeplted 10 sessions)    Three female surgeries mentioned adhesions per Dr. Schwartz's note    HLA DQ2/DQ8 +, Celiac panel negative. EGD w duodenal bx negative.    IgE elevated.  Negative EoE bx    Anxiety. No depression.   On celexa 20 mg daily and elavil 12.5 mg daily with some improvement. Per pcp. Has  not seen psych.  -Discussed the role of therapy in management of functional GI disorders.  Patient agrees to consider counseling.   -Again discussed the role of exercise in management of functional GI disorders.  PT currently participating in yoga/medication classes.      Mild Fatty liver on abd us.  -Fu w ref MD LAGUERRE+. Denies rheum sx     Borderline QTC  -Fu w cardiology to address     Weight gain on celexa and elavil.  -Fu w PCP to discuss/consider changing     Follow up in about 3 months (around 9/18/2019) for RANDALL stratton NP, Dr. Coburn in 6 months.    1. Gastroesophageal reflux disease, esophagitis presence not specified    2. Bloating    3. Constipation, unspecified constipation type    4. Abdominal pain, unspecified abdominal location          Order summary:         Thank you so much for allowing me to participate in the care of Joann López    Vivi Coburn MD

## 2019-06-18 NOTE — PATIENT INSTRUCTIONS
-Take Linzess 72mg daily about one hour after breakfast   -Return to cardiology to discuss the borderline elevated QTC on EKG.  Ask him to review your medications to make sure they are not causing prolonged QTC. Ask about safety of Elavil 50mg and celexa 20mg.    -See a local therapist to work on diaphragmatic breathing and cognitive behavioral therapy to decrease hypersensitivity in Gi tract and improve your anxiety and to suppress belching with breathing technique.  He or she can call me to help clarify my recomendation.  -Talk to your PCP about weight gain on celexa and elavil.  -Complete the SIBO test at home

## 2019-09-17 ENCOUNTER — OFFICE VISIT (OUTPATIENT)
Dept: GASTROENTEROLOGY | Facility: CLINIC | Age: 64
End: 2019-09-17
Payer: COMMERCIAL

## 2019-09-17 VITALS
HEIGHT: 63 IN | SYSTOLIC BLOOD PRESSURE: 145 MMHG | DIASTOLIC BLOOD PRESSURE: 81 MMHG | WEIGHT: 173.06 LBS | BODY MASS INDEX: 30.66 KG/M2 | HEART RATE: 66 BPM

## 2019-09-17 DIAGNOSIS — K21.9 GASTROESOPHAGEAL REFLUX DISEASE, ESOPHAGITIS PRESENCE NOT SPECIFIED: Primary | ICD-10-CM

## 2019-09-17 DIAGNOSIS — R13.19 ESOPHAGEAL DYSPHAGIA: ICD-10-CM

## 2019-09-17 DIAGNOSIS — R14.0 BLOATING: ICD-10-CM

## 2019-09-17 DIAGNOSIS — R10.9 ABDOMINAL PAIN, UNSPECIFIED ABDOMINAL LOCATION: ICD-10-CM

## 2019-09-17 DIAGNOSIS — K59.09 CONSTIPATION, CHRONIC: ICD-10-CM

## 2019-09-17 PROCEDURE — 99215 OFFICE O/P EST HI 40 MIN: CPT | Mod: S$GLB,,, | Performed by: NURSE PRACTITIONER

## 2019-09-17 PROCEDURE — 99215 PR OFFICE/OUTPT VISIT, EST, LEVL V, 40-54 MIN: ICD-10-PCS | Mod: S$GLB,,, | Performed by: NURSE PRACTITIONER

## 2019-09-17 PROCEDURE — 3008F BODY MASS INDEX DOCD: CPT | Mod: CPTII,S$GLB,, | Performed by: NURSE PRACTITIONER

## 2019-09-17 PROCEDURE — 99999 PR PBB SHADOW E&M-EST. PATIENT-LVL IV: ICD-10-PCS | Mod: PBBFAC,,, | Performed by: NURSE PRACTITIONER

## 2019-09-17 PROCEDURE — 3008F PR BODY MASS INDEX (BMI) DOCUMENTED: ICD-10-PCS | Mod: CPTII,S$GLB,, | Performed by: NURSE PRACTITIONER

## 2019-09-17 PROCEDURE — 99999 PR PBB SHADOW E&M-EST. PATIENT-LVL IV: CPT | Mod: PBBFAC,,, | Performed by: NURSE PRACTITIONER

## 2019-09-17 NOTE — PATIENT INSTRUCTIONS
-Continue to take Linzess 72mg daily   -Cont to see your local therapist to work on diaphragmatic breathing and cognitive behavioral therapy to decrease hypersensitivity in Gi tract and improve your anxiety and to suppress belching with breathing technique.    -Practice diaphragmatic breathing three times per day.  Follow the instructions in this video:   [email or web address]   -Complete the SIBO test at home   -Continue aciphex twice daily. Increase gaviscon to three times daily and nightly for reflux.

## 2019-09-17 NOTE — PROGRESS NOTES
[facility] Consultation Note    Reason for Consult:    Chief Complaint   Patient presents with    Heartburn    Dysphagia    Gas         PCP:   Primary Doctor No       Referring MD:  Basil Anne Md  [address] [facility]  [address]  [place] [address]    HPI:  Joann López is a 64 y.o. female with a PMH of asthma, allergies, HLD, HTN referred to motility clinic for second opinion regarding the following problems:    GERD.  Comes and goes.  Usually  2/7 days good.  Wakes up w regurgitation.   Worse in the evening     Takes aciphex 20mg BID and Gaviscon HS    Sleeps on incline  Avoids eating prior to bedtime    Still w lots of belching in the am    Dysphagia.  Has occurred 2-3 times since last GI visit.   Onset of symptoms:3 years ago  Problems with solids:yes  Problems with liquids:yes  Location: upper esophagus  Previously resolved w dilation     Abdominal pain. No longer having. Improved w linzess/elavil 50 mg HS  On celexa 10 mg daily  Character:heavy, spasm  Location:epigastric, RUQ, RLQ    Gas and bloating. Still having it, but not as bad  Did not complete SIBO breath test   Avoids lactose and artificial sugars, which makes a big difference     Constipation. Significantly Improved.   [place] 4  Daily BMs  Completed 10 weeks of PT BID w significant improvement in symptoms  MEDs: Linzess 72mg before breakfast daily,   previously getting  diarrhea if taking linzess daily, but now with formed stool daily   No longer taking Mg   Takes uyen supplement     Seen by urogyn, which recommended no surgery and back to pelvic floor PT    Anxiety. No depression. Improved. On celexa 10mg  daily with some improvement. Per pcp. Is working with local therapist on diaphragmatic breathing.     Insomnia. Resolved w elavil 50mg HS    Mild Fatty liver on abd us. S/w pcp. Given hep b vaccine. Monitoring. Suspect d/t cholesterol meds.     EKG.  QTC prolonged. Seen by cardiology.  Told that has irregular heart beat.  Started on bisoprolol.  Primary Gi increased elavil to 50mg.  Previously Pt decreased elavil to 25mg bc of weight gain, but back on 50 mg HS. PT states she s/w primary GI about weight gain on celexa and elavil, but advised to stay on it since GI pain is so much better.  Discussed prolonged QTC with cardiologist who told her she could stay on elavil and celexa.    Denies  nausea, vomiting, early satiety, diarrhea, melena, weight loss, insomnia.       Total visit time was 40 minutes, more than 50% of which was spent in face-to-face counseling with patient regarding symptoms, diagnostic results, prognosis, risks and benefits of treatment options, instructions for management, importance of compliance with chosen treatment options, risk factor reduction, stress reduction, coping strategies.      Previous Studies:   BRAVO off meds 2/14/19: Significant acid reflux off medication, especially in recumbent position. Total percent time pH less than 4 (< 4.5; <1 if PPI BID): 5.8.  Percent time pH less than 4 Upright (<8.4; <1.5 if PPI BID): 6.7.  Percent time pH less than 4 Recumbent (<3.5; 0.5 if PPI BID): 4.6.  DeMeester Score (<14.7): 21.2.  Negative SI.  Positive SAP for chest pain and belch.  EGD 2/14/19: nl esophagus (-). BRAVO deployed. endoflip impedance with normal EJG distensibility and possible intermittent spasm of lower esophagus. 2 cm HH. Nl stomach (-). Gastric polyps (FGPs). Nl duodenum (-).   MRI defecography 1/28/19: Grade 1-2 (mild-to-moderate) cystocele with urethral hypermobility. Minimal grade 1 vaginal descent . Grade 2 (moderate) rectocele.  Abnormal widening and descent of the levator hiatus and anorectal junction during defecation.  ARM 1/21/19: High resting sphincter pressure.  Adequate Squeeze. Dyssyngergic defecation Type IV. Hyposensitivity in the rectum. Patient was able to evacuate 50cc balloon.  HIDA 7/3/18: nl EF 47%  Abd us limited 6/28/18:nl gallbladder.  mild fatty liver.   Abd xray 2/2/18: nl  GES 7/18/17: initially slow but otherwise normal gastric emptying. The findings do not support gastroparesis. 86% ret at 1 hr, 67 % ret at 2 hrs, 32% ret at 3 hrs, 6% ret at 4 hrs.  * Anal manometry 6/1/17: Normal. Pt reports done at [facility]  Esophageal manometry 1/25/17: ineffective esophageal motility. frequent failed peristalsis w/ very large breaks. Min if any peristaltic activity in several swallows. IRP nl 4.5, DCI nl 777.1, 18% simultaneous contractions, 55% failed swallows  EGD 1/20/17: nl esophagus(min reflux changes) dilated with 56 fr bougie, gastric erythema (mild reactivity, mild chr gastritis). Nl duodenum (-).  Colon 6/18/15: FPTC. nl colon  EGD 5/21/15: nl esophagus (-) dilated w/ 50 fr easley, gastric erosions (erosive gastritis).   Per pt report, pH impedence 2015: + reflux correlated with s/s.  EGD 2/1/07: Nl esophagus. Gastric erythema (?). Nl duodenum (?).   Colon 2/1/07:no comment on prep. Complete to cecum. Nl colon  Flex sig 12/23/93: complete to mid TC. Melanosis coli.     Labs:  8/15/18: h. pylori stool Ag -  4/2017: CMP unremarkable, Vit D low 25, iron nl, b 12 nl, folate nl, TSH nl, CBC unremarkable  CARLOTTA +, speckled pattern, titer nl <1:40  scleroderma profile -  celiac disease panel -  IgA nl  T IgE high 367  IgG nl    *per referring provider note    ROS:  ROS   Constitutional: No fevers, no chills, no night sweats, no weight loss  ENT: no congestion, + rhinorrhea, + chronic sinus problems  CV: No chest pain, no palpitations  Pulm: No cough, no shortness of breath  Ophtho: No blurry vision, no eye redness  GI: see HPI  Derm: No rash  Heme: No lymphadenopathy, no bruising  MSK: no joint pain, no joint swelling, no Raynauds  : No dysuria, no frequent urination, no blood in urine  Endo: No hot or cold intolerance  Neuro: No dizziness, no syncope, no seizure  Psych: No anxiety, no depression        Medical History:   Past Medical History:    Diagnosis Date    Asthma     Environmental allergies     HLD (hyperlipidemia)     HTN (hypertension)         Surgical History:   Past Surgical History:   Procedure Laterality Date     SECTION      EGD (ESOPHAGOGASTRODUODENOSCOPY) N/A 2019    Performed by Viv[facility]urn [facility] at [facility] (4TH FLR)    HYSTERECTOMY      MANOMETRY, ANORECTAL N/A 2019    Performed by [facility]urn [facility] at [facility] (4TH FLR)    OOPHORECTOMY      PARTIAL HYSTERECTOMY      PH MONITORING, ESOPHAGUS, WIRELESS, (OFF REFLUX MEDS) N/A 2019    Performed by Viv[facility] Coburn MD at [facility] (4TH FLR)    TONSILLECTOMY      10 yrs ago        Family History:   Family History   Problem Relation Age of Onset    Celiac disease Mother     Irritable bowel syndrome Mother     Colon cancer Neg Hx     Crohn's disease Neg Hx     Esophageal cancer Neg Hx     Rectal cancer Neg Hx     Stomach cancer Neg Hx     Ulcerative colitis Neg Hx         Social History:   Social History     Socioeconomic History    Marital status: Unknown     Spouse name: Not on file    Number of children: Not on file    Years of education: Not on file    Highest education level: Not on file   Occupational History    Not on file   Social Needs    Financial resource strain: Not on file    Food insecurity:     Worry: Not on file     Inability: Not on file    Transportation needs:     Medical: Not on file     Non-medical: Not on file   Tobacco Use    Smoking status: Never Smoker    Smokeless tobacco: Never Used   Substance and Sexual Activity    Alcohol use: Yes     Comment: socially    Drug use: No    Sexual activity: Not on file   Lifestyle    Physical activity:     Days per week: Not on file     Minutes per session: Not on file    Stress: Not on file   Relationships    Social connections:     Talks on phone: Not on file     Gets together: Not on file     Attends Anabaptism service: Not on file     Active member of club or  "organization: Not on file     Attends meetings of clubs or organizations: Not on file     Relationship status: Not on file   Other Topics Concern    Not on file   Social History Narrative    Not on file        Review of patient's allergies indicates:  Allergies not on file    Current Outpatient Medications   Medication Sig Dispense Refill    amitriptyline (ELAVIL) 25 MG tablet TAKE ONE TABLET BY MOUTH AT BEDTIME AS DIRECTED  10    atorvastatin (LIPITOR) 40 MG tablet Take 40 mg by mouth once daily.      bisoprolol-hydrochlorothiazide (ZIAC) 10-6.25 mg per tablet Take 1 tablet by mouth once daily.       chlorpheniramine (CHLOR-TRIMETON) 4 mg tablet Take 4 mg by mouth every 6 (six) hours as needed for Allergies.      citalopram (CELEXA) 20 MG tablet Take 20 mg by mouth once daily.      coQ10, ubiquinol, 200 mg Cap Take 200 mg by mouth.      fluticasone (FLONASE) 50 mcg/actuation nasal spray 1 spray by Nasal route.      IBUPROFEN ORAL Take by mouth.      linaclotide (LINZESS) 72 mcg Cap Take 1 capsule (72 mcg total) by mouth once daily. 30 capsule 6    montelukast (SINGULAIR) 10 mg tablet Take 10 mg by mouth.      PREMARIN 0.625 mg tablet       RABEprazole (ACIPHEX) 20 mg tablet Take 1 tablet (20 mg total) by mouth 2 (two) times daily. 180 tablet 3    rosuvastatin (CRESTOR) 20 MG tablet Take 20 mg by mouth once daily.      simethicone (GAS-X ORAL) Take by mouth.      vitamin E 400 UNIT capsule Take 400 Units by mouth.       No current facility-administered medications for this visit.         Objective Findings:  Vital Signs:  BP (!) 145/81   Pulse 66   Ht 5' 3" (1.6 m)   Wt 78.5 kg (173 lb 1 oz)   BMI 30.66 kg/m²   Body mass index is 30.66 kg/m².    Physical Exam:  General appearance: alert, cooperative, no distress  HENT: Normocephalic, atraumatic, neck symmetrical, no nasal discharge  Eyes: conjunctivae/corneas clear, PERRL, EOM's intact  Lungs: clear to auscultation bilaterally, no dullness to " percussion bilaterally  Heart: regular rate and rhythm without rub; no displacement of the PMI  Abdomen: soft, non-tender; bowel sounds normoactive; no organomegaly  Extremities: extremities symmetric; no clubbing, cyanosis, or edema  Integument: Skin color, texture, turgor normal; no rashes; hair distrubution normal  Neurologic: Alert and oriented X 3, normal strength, normal coordination and gait  Psychiatric: no pressured speech; normal affect; no evidence of impaired cognition      Labs:  No results found for: WBC, HGB, HCT, MCV, PLT  No results found for: FERRITIN  Lab Results   Component Value Date    CREATININE 0.7 12/10/2018     No results found for: TSH  No results found for: SEDRATE  No results found for: CRP  No results found for: LABA1C, HGBA1C        Assessment and Plan:  Joann López is a 64 y.o. female with a a PMH of asthma, allergies, HLD, HTN referred to motility clinic for second opinion regarding the following problems:    GERD.  BRAVO w significant acid reflux off medication, especially in recumbent position.  Poor symptom correlation  No improvement with prevacid nor zegerid once daily in the past.   Nexium once daily lost efficacy.   Dexilant 60 mg daily lost efficacy.   No improvement with protonix 40 mg daily, zantac 150 mg 6 times daily, and gas ex PRN  No improvement with omeprazole 40 mg BID and zantac 150 mg twice daily  -Cont aciphex BID and increase gaviscon with alginate to 4 times daily prn.  -Clinical presentation overall highly suggestive of visceral hypersensitivity.  Cont celexa 10mg and elavil 50mg   - PT hesitant to take PPIs long term and  would like to consider nissen fundoplication if no relief with the above.  We again discussed that pt would be at increased risk of gas/bloat syndrome and dysphagia.   -Again discussed GERD lifestyle precautions       Dysphagia.Esophageal manometry at [facility] with ineffective esophageal motility. EGD negative. Endoflip with normal  distensibility and possible lower esophageal spsams.  Previously resolved last empiric esophageal dilation in 1/2017.  2-3 occurrences of dysphagia since 6/2019.  -Continue to monitor  -Will consider repeat esophageal manometry if symptoms worsen or if decides to proceed w antireflux surgery    Abdominal pain associated with bowel movements. Previously w nocturnal pain. IBS-C.  Now resolved.     IBgard lost efficacy.   No improvement with iberogast.  Some improvement with librax in the past.   Some improvement with eliminating lactose and artificial sugars  Significant improvement with celexa 10 mg daily and elavil 50 mg HS  Improved with yoga, elavil, pelvic physical therapy  -Cont celexa and elavil  -Has not tried FDgard, Bentyl, Levsin, Levbid.    Gas and bloating. w distention.   Some improvement with eliminating lactose and artificial sugars  -Check SIBO testing as previously advised  -cont celexa 10mg and elavil 50 mg  -Will consider Iberogast again     Constipation. Now with bristol type 4 BM daily. PT reports IBS-C since having children (40 yrs ago), worse 4-5 years ago.  Recent anorectal manometry with high resting sphincter pressure, dyssyngergic defecation Type IV, hyposensitivity, able to evacuate 50cc balloon.  MRI defecography with evidence of pelvic floor dysfunction, rectocele.    Cannot recall if fiber helped  Unable to tolerate miralax due to bloating  Unable to tolerate linzess 145 mcg due to diarrhea  No improvement with Amitiza 24 mcg daily  Some improvement  with dulcolax PRN in the past  Some improvement with [place] urbal laxative (senna)  In the past  Some improvement with trulance 3 mg daily, but causes diarrhea  Significant improvement with pelvic floor PT (comeplted 10 sessions)  -Cont linzess 72mcg daily   - Will consider amitiza 8mg BID if no improvement w above     MRI defecography with Cystocele and grade 1 vaginal descent.  Seen by urogyn; surgery deemed unnecessary  Significant  improvement with pelvic floor PT (comeplted 10 sessions)    Three female surgeries mentioned adhesions per Dr. Schwartz's note    HLA DQ2/DQ8 +, Celiac panel negative. EGD w duodenal bx negative.    IgE elevated.  Negative EoE bx    Anxiety. No depression.   On celexa 10 mg daily and elavil 50 mg daily with some improvement. Per pcp.   Is working with local psychologist on diaphragmatic breathing  Does yoga three days weekly     Mild Fatty liver on abd us.  Had hep B vaccine per pcp. Suspect fatty liver due to cholesterol meds.Is followed by PCP.    CARLOTTA+. Denies rheum sx     Borderline QTC. Seen by cards. Advised okay to continue celexa and elavil.      Weight gain on celexa and elavil. Advised to continue per referring GI given benefits out weight risks.     Follow up in about 4 months (around 1/17/2020) for Motility w Dr. Coburn.    1. Gastroesophageal reflux disease, esophagitis presence not specified    2. Esophageal dysphagia    3. Bloating    4. Abdominal pain, unspecified abdominal location    5. Constipation, chronic          Order summary:         Thank you so much for allowing me to participate in the care of Joann Diallo, ANALILIA, FNP-C

## 2019-10-24 ENCOUNTER — TELEPHONE (OUTPATIENT)
Dept: GASTROENTEROLOGY | Facility: CLINIC | Age: 64
End: 2019-10-24

## 2020-01-13 ENCOUNTER — OFFICE VISIT (OUTPATIENT)
Dept: GASTROENTEROLOGY | Facility: CLINIC | Age: 65
End: 2020-01-13
Payer: COMMERCIAL

## 2020-01-13 ENCOUNTER — HOSPITAL ENCOUNTER (OUTPATIENT)
Dept: CARDIOLOGY | Facility: CLINIC | Age: 65
Discharge: HOME | End: 2020-01-13
Payer: COMMERCIAL

## 2020-01-13 ENCOUNTER — PATIENT MESSAGE (OUTPATIENT)
Dept: GASTROENTEROLOGY | Facility: CLINIC | Age: 65
End: 2020-01-13

## 2020-01-13 VITALS
DIASTOLIC BLOOD PRESSURE: 86 MMHG | BODY MASS INDEX: 30.97 KG/M2 | HEIGHT: 63 IN | SYSTOLIC BLOOD PRESSURE: 171 MMHG | HEART RATE: 66 BPM | WEIGHT: 174.81 LBS

## 2020-01-13 DIAGNOSIS — K21.9 GASTROESOPHAGEAL REFLUX DISEASE, ESOPHAGITIS PRESENCE NOT SPECIFIED: ICD-10-CM

## 2020-01-13 DIAGNOSIS — Z51.81 THERAPEUTIC DRUG MONITORING: ICD-10-CM

## 2020-01-13 DIAGNOSIS — R13.10 DYSPHAGIA, UNSPECIFIED TYPE: ICD-10-CM

## 2020-01-13 DIAGNOSIS — K59.00 CONSTIPATION, UNSPECIFIED CONSTIPATION TYPE: ICD-10-CM

## 2020-01-13 DIAGNOSIS — R14.0 BLOATING: ICD-10-CM

## 2020-01-13 DIAGNOSIS — R10.9 ABDOMINAL PAIN, UNSPECIFIED ABDOMINAL LOCATION: ICD-10-CM

## 2020-01-13 DIAGNOSIS — Z51.81 THERAPEUTIC DRUG MONITORING: Primary | ICD-10-CM

## 2020-01-13 PROCEDURE — 3008F BODY MASS INDEX DOCD: CPT | Mod: CPTII,S$GLB,, | Performed by: INTERNAL MEDICINE

## 2020-01-13 PROCEDURE — 93005 EKG 12-LEAD: ICD-10-PCS | Mod: S$GLB,,, | Performed by: INTERNAL MEDICINE

## 2020-01-13 PROCEDURE — 93010 ELECTROCARDIOGRAM REPORT: CPT | Mod: S$GLB,,, | Performed by: INTERNAL MEDICINE

## 2020-01-13 PROCEDURE — 99999 PR PBB SHADOW E&M-EST. PATIENT-LVL IV: CPT | Mod: PBBFAC,,, | Performed by: INTERNAL MEDICINE

## 2020-01-13 PROCEDURE — 93005 ELECTROCARDIOGRAM TRACING: CPT | Mod: S$GLB,,, | Performed by: INTERNAL MEDICINE

## 2020-01-13 PROCEDURE — 99999 PR PBB SHADOW E&M-EST. PATIENT-LVL IV: ICD-10-PCS | Mod: PBBFAC,,, | Performed by: INTERNAL MEDICINE

## 2020-01-13 PROCEDURE — 99215 OFFICE O/P EST HI 40 MIN: CPT | Mod: S$GLB,,, | Performed by: INTERNAL MEDICINE

## 2020-01-13 PROCEDURE — 99215 PR OFFICE/OUTPT VISIT, EST, LEVL V, 40-54 MIN: ICD-10-PCS | Mod: S$GLB,,, | Performed by: INTERNAL MEDICINE

## 2020-01-13 PROCEDURE — 93010 EKG 12-LEAD: ICD-10-PCS | Mod: S$GLB,,, | Performed by: INTERNAL MEDICINE

## 2020-01-13 PROCEDURE — 3008F PR BODY MASS INDEX (BMI) DOCUMENTED: ICD-10-PCS | Mod: CPTII,S$GLB,, | Performed by: INTERNAL MEDICINE

## 2020-01-13 NOTE — PATIENT INSTRUCTIONS
-Complete SIBO breath test at home   -Decrease aciphex to as needed for    -Continue Gaviscon twice per day as needed   -Practice diaphragmatic breathing three times per day.  Follow the instructions in this video:   Https://www.youtube.com/watch?v=[email or web address].  When you feel comfortable start using to it to suppress belching.    -See a local counselor that will help you work on stress reduction of diaphragmatic breathing   -Follow up with Dr. Anne for continuous management of your GI symptoms   -Return to our motility clinic as needed   -Consider gluten challenge and testing for celiac disease with Dr. Anne in the future, when your symptoms have been controlled for a while   -Monitor your EKG for prolonged QTC with Dr. Anne every 6 months   - You can consider slowly discontinuing the elavil in 6 - 12 months if you continue to do well.  Thi si helping with your hypersensitivity.  This will have to be done under Dr. Anne's supervision   -Try iberogast 5 to 20 drops (1 mL), 3 times a day for abdominal pain, nausea, vomiting, bloating.  The duration of taking Iberogast depends on the severity of your symptoms and how long they last.  Try to take it for at least 3 weeks to see if it makes a difference

## 2020-01-13 NOTE — PROGRESS NOTES
[facility] Consultation Note    Reason for Consult:    Chief Complaint   Patient presents with    Heartburn    Gas    Bloated         PCP:   Dillan Mckeon       Referring MD:  Basil Anne Md  [address] [facility]  [address]  [place] [address]    HPI:  Joann López is a 64 y.o. female with a PMH of asthma, allergies, HLD, HTN referred to motility clinic for second opinion regarding the following problems:    GERD. Significantly improved.   MEDS:   Takes aciphex 20mg daily and QHS prn  Gaviscon w alginate BID prn - this has made a huge difference    Sleeps on incline  Avoids eating prior to bedtime  Still w some belching.  Improved    Dysphagia.  Occasional.  Few times per week   Onset of symptoms:3 years ago  Problems with solids:yes  Problems with liquids:yes  Location: upper esophagus  Previously resolved w dilation     Abdominal pain. Significantly improved  MEDS:linzess/elavil 50 mg HS, celexa 10 mg daily    Gas and bloating. Still having it, but not as bad  Did not complete SIBO breath test   Avoids lactose and artificial sugars, which makes a big difference     Constipation. Significantly Improved.   [place] 4  Daily BMs  Completed 10 weeks of PT BID w significant improvement in symptoms  MEDs: Linzess 72mg before breakfast daily     Seen by urogyn, which recommended no surgery and back to pelvic floor PT    Anxiety. No depression. Improved.   On celexa 10mg  daily with some improvement. Per pcp.   Will consider kaitlyn a local counselor     Insomnia. Resolved w elavil 50mg HS    Mild Fatty liver on abd us. S/w pcp. Given hep b vaccine. Monitoring. Suspect d/t cholesterol meds.     EKG.  QTC prolonged. Seen by cardiology. Told that has irregular heart beat.  Started on bisoprolol.  Primary Gi increased elavil to 50mg.  Previously Pt decreased elavil to 25mg bc of weight gain, but back on 50 mg HS. PT states she s/w primary GI about weight gain on  celexa and elavil, but advised to stay on it since GI pain is so much better.  Discussed prolonged QTC with cardiologist who told her she could stay on elavil and celexa.    Denies  nausea, vomiting, early satiety, diarrhea, melena, weight loss, insomnia.       Total visit time was 40 minutes, more than 50% of which was spent in face-to-face counseling with patient regarding symptoms, diagnostic results, prognosis, risks and benefits of treatment options, instructions for management, importance of compliance with chosen treatment options, risk factor reduction, stress reduction, coping strategies.      Previous Studies:   BRAVO off meds 2/14/19: Significant acid reflux off medication, especially in recumbent position. Total percent time pH less than 4 (< 4.5; <1 if PPI BID): 5.8.  Percent time pH less than 4 Upright (<8.4; <1.5 if PPI BID): 6.7.  Percent time pH less than 4 Recumbent (<3.5; 0.5 if PPI BID): 4.6.  DeMeester Score (<14.7): 21.2.  Negative SI.  Positive SAP for chest pain and belch.  EGD 2/14/19: nl esophagus (-). BRAVO deployed. endoflip impedance with normal EJG distensibility and possible intermittent spasm of lower esophagus. 2 cm HH. Nl stomach (-). Gastric polyps (FGPs). Nl duodenum (-).   MRI defecography 1/28/19: Grade 1-2 (mild-to-moderate) cystocele with urethral hypermobility. Minimal grade 1 vaginal descent . Grade 2 (moderate) rectocele.  Abnormal widening and descent of the levator hiatus and anorectal junction during defecation.  ARM 1/21/19: High resting sphincter pressure.  Adequate Squeeze. Dyssyngergic defecation Type IV. Hyposensitivity in the rectum. Patient was able to evacuate 50cc balloon.  HIDA 7/3/18: nl EF 47%  Abd us limited 6/28/18:nl gallbladder. mild fatty liver.   Abd xray 2/2/18: nl  GES 7/18/17: initially slow but otherwise normal gastric emptying. The findings do not support gastroparesis. 86% ret at 1 hr, 67 % ret at 2 hrs, 32% ret at 3 hrs, 6% ret at 4 hrs.  * Anal  manometry 6/1/17: Normal. Pt reports done at [facility]  Esophageal manometry 1/25/17: ineffective esophageal motility. frequent failed peristalsis w/ very large breaks. Min if any peristaltic activity in several swallows. IRP nl 4.5, DCI nl 777.1, 18% simultaneous contractions, 55% failed swallows  EGD 1/20/17: nl esophagus(min reflux changes) dilated with 56 fr bougie, gastric erythema (mild reactivity, mild chr gastritis). Nl duodenum (-).  Colon 6/18/15: FPTC. nl colon  EGD 5/21/15: nl esophagus (-) dilated w/ 50 fr easley, gastric erosions (erosive gastritis).   Per pt report, pH impedence 2015: + reflux correlated with s/s.  EGD 2/1/07: Nl esophagus. Gastric erythema (?). Nl duodenum (?).   Colon 2/1/07:no comment on prep. Complete to cecum. Nl colon  Flex sig 12/23/93: complete to mid TC. Melanosis coli.     Labs:  8/15/18: h. pylori stool Ag -  4/2017: CMP unremarkable, Vit D low 25, iron nl, b 12 nl, folate nl, TSH nl, CBC unremarkable  CARLOTTA +, speckled pattern, titer nl <1:40  scleroderma profile -  celiac disease panel -  IgA nl  T IgE high 367  IgG nl  HLADQ2/8+    *per referring provider note    ROS:  ROS   Constitutional: No fevers, no chills, no night sweats, no weight loss  ENT: no congestion, + rhinorrhea, + chronic sinus problems  CV: No chest pain, no palpitations  Pulm: No cough, no shortness of breath  Ophtho: No blurry vision, no eye redness  GI: see HPI  Derm: No rash  Heme: No lymphadenopathy, no bruising  MSK: no joint pain, no joint swelling, no Raynauds  : No dysuria, no frequent urination, no blood in urine  Endo: No hot or cold intolerance  Neuro: No dizziness, no syncope, no seizure  Psych: No anxiety, no depression        Medical History:   Past Medical History:   Diagnosis Date    Asthma     Environmental allergies     HLD (hyperlipidemia)     HTN (hypertension)         Surgical History:   Past Surgical History:   Procedure Laterality Date    ANORECTAL MANOMETRY N/A  2019    Procedure: MANOMETRY, ANORECTAL;  Surgeon: Vivi Coburn MD;  Location: [facility] ([address] [place] [address]);  Service: Endoscopy;  Laterality: N/A;     SECTION      ESOPHAGOGASTRODUODENOSCOPY N/A 2019    Procedure: EGD (ESOPHAGOGASTRODUODENOSCOPY);  Surgeon: Vivi Coburn MD;  Location: [address]);  Service: Endoscopy;  Laterality: N/A;    HYSTERECTOMY      OOPHORECTOMY      PARTIAL HYSTERECTOMY      TONSILLECTOMY      10 yrs ago        Family History:   Family History   Problem Relation Age of Onset    Celiac disease Mother     Irritable bowel syndrome Mother     Colon cancer Neg Hx     Crohn's disease Neg Hx     Esophageal cancer Neg Hx     Rectal cancer Neg Hx     Stomach cancer Neg Hx     Ulcerative colitis Neg Hx         Social History:   Social History     Socioeconomic History    Marital status: Unknown     Spouse name: Not on file    Number of children: Not on file    Years of education: Not on file    Highest education level: Not on file   Occupational History    Not on file   Social Needs    Financial resource strain: Not on file    Food insecurity:     Worry: Not on file     Inability: Not on file    Transportation needs:     Medical: Not on file     Non-medical: Not on file   Tobacco Use    Smoking status: Never Smoker    Smokeless tobacco: Never Used   Substance and Sexual Activity    Alcohol use: Yes     Comment: socially    Drug use: No    Sexual activity: Not on file   Lifestyle    Physical activity:     Days per week: Not on file     Minutes per session: Not on file    Stress: Not on file   Relationships    Social connections:     Talks on phone: Not on file     Gets together: Not on file     Attends Baptism service: Not on file     Active member of club or organization: Not on file     Attends meetings of clubs or organizations: Not on file     Relationship status: Not on file   Other Topics Concern    Not on file   Social History Narrative  "   Not on file        Review of patient's allergies indicates:  Allergies not on file    Current Outpatient Medications   Medication Sig Dispense Refill    amitriptyline (ELAVIL) 25 MG tablet TAKE ONE TABLET BY MOUTH AT BEDTIME AS DIRECTED  10    atorvastatin (LIPITOR) 40 MG tablet Take 40 mg by mouth once daily.      bisoprolol-hydrochlorothiazide (ZIAC) 10-6.25 mg per tablet Take 1 tablet by mouth once daily.       chlorpheniramine (CHLOR-TRIMETON) 4 mg tablet Take 4 mg by mouth every 6 (six) hours as needed for Allergies.      citalopram (CELEXA) 20 MG tablet Take 20 mg by mouth once daily.      coQ10, ubiquinol, 200 mg Cap Take 200 mg by mouth.      fluticasone (FLONASE) 50 mcg/actuation nasal spray 1 spray by Nasal route.      IBUPROFEN ORAL Take by mouth.      linaclotide (LINZESS) 72 mcg Cap Take 1 capsule (72 mcg total) by mouth once daily. 30 capsule 6    montelukast (SINGULAIR) 10 mg tablet Take 10 mg by mouth.      PREMARIN 0.625 mg tablet       RABEprazole (ACIPHEX) 20 mg tablet Take 1 tablet (20 mg total) by mouth 2 (two) times daily. 180 tablet 3    rosuvastatin (CRESTOR) 20 MG tablet Take 20 mg by mouth once daily.      simethicone (GAS-X ORAL) Take by mouth.      vitamin E 400 UNIT capsule Take 400 Units by mouth.       No current facility-administered medications for this visit.         Objective Findings:  Vital Signs:  BP (!) 171/86   Pulse 66   Ht 5' 3" (1.6 m)   Wt 79.3 kg (174 lb 13.2 oz)   BMI 30.97 kg/m²   Body mass index is 30.97 kg/m².    Physical Exam:  General appearance: alert, cooperative, no distress  HENT: Normocephalic, atraumatic, neck symmetrical, no nasal discharge  Eyes: conjunctivae/corneas clear, PERRL, EOM's intact  Lungs: clear to auscultation bilaterally, no dullness to percussion bilaterally  Heart: regular rate and rhythm without rub; no displacement of the PMI  Abdomen: soft, non-tender; bowel sounds normoactive; no organomegaly  Extremities: " extremities symmetric; no clubbing, cyanosis, or edema  Integument: Skin color, texture, turgor normal; no rashes; hair distrubution normal  Neurologic: Alert and oriented X 3, normal strength, normal coordination and gait  Psychiatric: no pressured speech; normal affect; no evidence of impaired cognition      Labs:  No results found for: WBC, HGB, HCT, MCV, PLT  No results found for: FERRITIN  Lab Results   Component Value Date    CREATININE 0.7 12/10/2018     No results found for: TSH  No results found for: SEDRATE  No results found for: CRP  No results found for: LABA1C, HGBA1C        Assessment and Plan:  Joann López is a 64 y.o. female with a a PMH of asthma, allergies, HLD, HTN referred to motility clinic for second opinion regarding the following problems:    GERD.  BRAVO w significant acid reflux off medication, especially in recumbent position.  Poor symptom correlation  No improvement with prevacid nor zegerid once daily in the past.   Nexium once daily lost efficacy.   Dexilant 60 mg daily lost efficacy.   No improvement with protonix 40 mg daily, zantac 150 mg 6 times daily, and gas ex PRN  No improvement with omeprazole 40 mg BID and zantac 150 mg twice daily  Significantly improved w elavil  -Decrease aciphex to prn   -Gaviscon with alginate prn   -Cont elavil 50mg QHS    Dysphagia. Esophageal manometry at [facility] with ineffective esophageal motility. EGD negative. Endoflip with normal distensibility and possible lower esophageal spasms.  Previously improved w esophageal dilations   Significantly improved recently   -Declined manometry   -Would consider repeat esophageal manometry if symptoms worsen or if decides to proceed w antireflux surgery    Abdominal pain associated with bowel movements.  IBS-C.    Significantly improved  IBgard lost efficacy.   No improvement with iberogast.  Some improvement with librax in the past.   Some improvement with eliminating lactose and artificial sugars  Significant  improvement with celexa 10 mg daily and elavil 50 mg HS  Improved with yoga, elavil, pelvic physical therapy  -Cont celexa and elavil  -Has not tried FDgard, Bentyl, Levsin, Levbid.    Gas and bloating. w distention.   Some improvement with eliminating lactose and artificial sugars  -Check SIBO testing as previously advised    Constipation. Recent anorectal manometry with high resting sphincter pressure, dyssyngergic defecation Type IV, hyposensitivity, able to evacuate 50cc balloon.  MRI defecography with evidence of pelvic floor dysfunction, rectocele.    Cannot recall if fiber helped  Unable to tolerate miralax due to bloating  Unable to tolerate linzess 145 mcg due to diarrhea  No improvement with Amitiza 24 mcg daily  Some improvement  with dulcolax PRN in the past  Some improvement with Shaklee urbal laxative (senna)  In the past  Some improvement with trulance 3 mg daily, but causes diarrhea  Significant improvement with pelvic floor PT (comeplted 10 sessions)  -Cont linzess 72mcg daily     MRI defecography with Cystocele and grade 1 vaginal descent.  Seen by urogyn; surgery deemed unnecessary  Significant improvement with pelvic floor PT (comeplted 10 sessions)    HLA DQ2/DQ8 +. Celiac panel negative. EGD w duodenal bx negative off gluten.  -discussed difference between celiac disease and gluten sensitivity  -Cont to avoid gluten for now.   -PT will discuss gluten challenge and celiac testing with Dr. Anne when she feel that she is ready to try gluten      IgE elevated.  Negative EoE bx    Anxiety. No depression.   On celexa 10 mg daily and elavil 50 mg daily with some improvement. Can consider tapering off elavil in 6-12 months if GI symptoms remain stable.   -Work with local counselor on diaphragmatic breathing  -Cont yoga    Mild Fatty liver on abd us.  Had hep B vaccine per pcp. Suspect fatty liver due to cholesterol meds.  -Khris LAGUERRE+. Denies rheum sx     Borderline QTC. Seen by cards.  Advised okay to continue celexa and elavil.  -Check EKG now and Q 6 months with Dr. Anne     Weight gain on celexa and elavil. Now stable. Previously advised to continue per referring GI given benefits out weight risks.     Follow up with Dr. Anne  for management of chronic GI problems.  Return to motility clinic as needed. Plan discussed with pt, pt agrees.      Follow up if symptoms worsen or fail to improve.    1. Therapeutic drug monitoring    2. Dysphagia, unspecified type    3. Gastroesophageal reflux disease, esophagitis presence not specified    4. Abdominal pain, unspecified abdominal location    5. Bloating    6. Constipation, unspecified constipation type          Order summary:  Orders Placed This Encounter    EKG 12-lead         Thank you so much for allowing me to participate in the care of Joann Coburn MD

## 2020-01-13 NOTE — LETTER
January 14, 2020      Basil Anne MD  [address] [facility]  [address]  Gastroenetrology Clinic  [facility] [address]           [facility]  [address]  [place] [address]  Phone: [phone]  Fax: [phone]          Patient: Joann López   MR Number: [id number]   YOB: 1955   Date of Visit: 1/13/2020       Dear Dr. Basil Anne:    Thank you for referring Joann López to me for evaluation. Attached you will find relevant portions of my assessment and plan of care.    If you have questions, please do not hesitate to call me. I look forward to following Joann López along with you.    Sincerely,    Vivi Coburn MD    Enclosure  CC:  No Recipients    If you would like to receive this communication electronically, please contact [email or web address] or [phone] to request more information on [facility] access.    For providers and/or their staff who would like to refer a patient to [facility], please contact us through our one-stop-shop provider referral line, [facility], at [phone].    If you feel you have received this communication in error or would no longer like to receive these types of communications, please e-mail [email or web address]

## 2020-01-14 ENCOUNTER — TELEPHONE (OUTPATIENT)
Dept: GASTROENTEROLOGY | Facility: CLINIC | Age: 65
End: 2020-01-14

## 2020-01-14 NOTE — TELEPHONE ENCOUNTER
----- Message from Michael Mckenzie sent at 1/14/2020  1:48 PM CST -----  Contact: pt @ [phone]  Pt states she's returning Tete's call

## 2020-01-14 NOTE — TELEPHONE ENCOUNTER
----- Message from Vivi Coburn MD sent at 1/13/2020  6:41 PM CST -----  Please let pt know that EKG shows some nonspecific abnormalities   She should fu w PCP or cardilogy to discuss these.  QTC, which I was checking is ok. Pls fax EKG to PCP or cardiology

## 2020-01-14 NOTE — TELEPHONE ENCOUNTER
----- Message from Lester Sadler MA sent at 1/14/2020  8:52 AM CST -----      ----- Message -----  From: Aundrea Ken  Sent: 1/14/2020   8:30 AM CST  To: [facility] Staff    returning call to Bj Garza re: EKG results    Pt contact [phone]

## 2020-06-01 ENCOUNTER — TELEPHONE (OUTPATIENT)
Dept: GASTROENTEROLOGY | Facility: CLINIC | Age: 65
End: 2020-06-01

## 2020-06-01 NOTE — TELEPHONE ENCOUNTER
----- Message from Katerina Cutlerleonard sent at 6/1/2020  2:09 PM CDT -----  Contact: pt at [phone]  Almas pt-Pt is calling in ref to at type of sleeping aparatus for her .  Cant find the name of it in her notes.  Please call with the name.

## 2020-06-02 NOTE — TELEPHONE ENCOUNTER
These lifestyle modifications may help with acid reflux at night   -Elevate the head of the bed.  This can be achieved by purchasing an adjustable bed frame or by putting six- to eight-inch blocks under the legs at the head of the bed or a styrofoam wedge under the mattress.   -Consider buying Medcline Sleep Device that will keep you sleeping on left side, which improves reflux at night  -Refrain from laying down after meals  -Avoid meals two to three hours before bedtime

## 2021-05-12 ENCOUNTER — PATIENT MESSAGE (OUTPATIENT)
Dept: RESEARCH | Facility: HOSPITAL | Age: 66
End: 2021-05-12

## 2021-07-26 ENCOUNTER — HISTORICAL (OUTPATIENT)
Dept: RADIOLOGY | Facility: HOSPITAL | Age: 66
End: 2021-07-26

## 2022-02-09 ENCOUNTER — HISTORICAL (OUTPATIENT)
Dept: ADMINISTRATIVE | Facility: HOSPITAL | Age: 67
End: 2022-02-09

## 2022-03-07 ENCOUNTER — HISTORICAL (OUTPATIENT)
Dept: ADMINISTRATIVE | Facility: HOSPITAL | Age: 67
End: 2022-03-07

## 2022-03-07 ENCOUNTER — HISTORICAL (OUTPATIENT)
Dept: RADIOLOGY | Facility: HOSPITAL | Age: 67
End: 2022-03-07

## 2022-04-09 ENCOUNTER — HISTORICAL (OUTPATIENT)
Dept: ADMINISTRATIVE | Facility: HOSPITAL | Age: 67
End: 2022-04-09
Payer: COMMERCIAL

## 2022-04-25 VITALS
BODY MASS INDEX: 31.48 KG/M2 | SYSTOLIC BLOOD PRESSURE: 160 MMHG | DIASTOLIC BLOOD PRESSURE: 68 MMHG | WEIGHT: 177.69 LBS | HEIGHT: 63 IN

## 2022-08-29 ENCOUNTER — LAB VISIT (OUTPATIENT)
Dept: LAB | Facility: HOSPITAL | Age: 67
End: 2022-08-29
Attending: INTERNAL MEDICINE
Payer: MEDICARE

## 2022-08-29 DIAGNOSIS — R10.13 EPIGASTRIC PAIN: Primary | ICD-10-CM

## 2022-08-29 DIAGNOSIS — R13.10 DYSPHAGIA, UNSPECIFIED TYPE: ICD-10-CM

## 2022-08-29 DIAGNOSIS — K92.1 BLACK STOOL: ICD-10-CM

## 2022-08-29 DIAGNOSIS — R11.0 NAUSEA: ICD-10-CM

## 2022-08-29 LAB
BASOPHILS # BLD AUTO: 0.03 X10(3)/MCL (ref 0–0.2)
BASOPHILS NFR BLD AUTO: 0.5 %
EOSINOPHIL # BLD AUTO: 0.23 X10(3)/MCL (ref 0–0.9)
EOSINOPHIL NFR BLD AUTO: 3.6 %
ERYTHROCYTE [DISTWIDTH] IN BLOOD BY AUTOMATED COUNT: 13.5 % (ref 11.5–17)
HCT VFR BLD AUTO: 36.7 % (ref 37–47)
HGB BLD-MCNC: 12.7 GM/DL (ref 12–16)
IMM GRANULOCYTES # BLD AUTO: 0.02 X10(3)/MCL (ref 0–0.04)
IMM GRANULOCYTES NFR BLD AUTO: 0.3 %
LYMPHOCYTES # BLD AUTO: 1.55 X10(3)/MCL (ref 0.6–4.6)
LYMPHOCYTES NFR BLD AUTO: 24.1 %
MCH RBC QN AUTO: 32.3 PG (ref 27–31)
MCHC RBC AUTO-ENTMCNC: 34.6 MG/DL (ref 33–36)
MCV RBC AUTO: 93.4 FL (ref 80–94)
MONOCYTES # BLD AUTO: 0.65 X10(3)/MCL (ref 0.1–1.3)
MONOCYTES NFR BLD AUTO: 10.1 %
NEUTROPHILS # BLD AUTO: 4 X10(3)/MCL (ref 2.1–9.2)
NEUTROPHILS NFR BLD AUTO: 61.4 %
PLATELET # BLD AUTO: 295 X10(3)/MCL (ref 130–400)
PMV BLD AUTO: 11.3 FL (ref 7.4–10.4)
RBC # BLD AUTO: 3.93 X10(6)/MCL (ref 4.2–5.4)
WBC # SPEC AUTO: 6.4 X10(3)/MCL (ref 4.5–11.5)

## 2022-08-29 PROCEDURE — 36415 COLL VENOUS BLD VENIPUNCTURE: CPT

## 2022-08-29 PROCEDURE — 85025 COMPLETE CBC W/AUTO DIFF WBC: CPT

## 2024-11-14 NOTE — TELEPHONE ENCOUNTER
Labs reviewed  A1c goal < 7.0  A1c at 6.4 and controlled  Monofilament exam up to date  Feet self assessment  Eye exam recommended yearly  Discussed healthy diabetic diet  Cont current medications     Pt called as she stated she wanted to know what the name of the pillow is that  suggested to help with reflux when lying down. Pls advise.   Discontinue Regimen: -\\n- metronidazole 1 % topical gel : Apply to face for rosacea at night\\n- doxycycline monohydrate 50 mg capsule : Take one tablet by mouth daily with a meal. Avoid calcium one hour prior and one hour after taking pill ( pt states he never gotten rx )\\n\\n- Soolantra 1 % topical cream QHS: Apply to rosacea on face every night Continue Regimen: - Oracea 40 mg capsule,immediate - delay release QD: Take one pill once daily with food. Initiate Treatment: pharmacy compounding accessory Qd\\nQuantity: 30.0 g  Days Supply: 30\\nSig: Azelaic acid 15% gel + metronidazole 1% + ivermectin 1%: Apply QD to rosacea Detail Level: Zone Plan: Follow up in 6 months Render In Strict Bullet Format?: No

## 2025-04-29 ENCOUNTER — TELEPHONE (OUTPATIENT)
Dept: SURGERY | Facility: CLINIC | Age: 70
End: 2025-04-29
Payer: MEDICARE

## 2025-04-30 DIAGNOSIS — K21.9 GERD (GASTROESOPHAGEAL REFLUX DISEASE): Primary | ICD-10-CM

## 2025-05-16 RX ORDER — AZELASTINE 1 MG/ML
1 SPRAY, METERED NASAL 2 TIMES DAILY
COMMUNITY

## 2025-05-16 RX ORDER — PSYLLIUM HUSK 0.4 G
600 CAPSULE ORAL ONCE
COMMUNITY

## 2025-05-16 RX ORDER — PRASUGREL 10 MG/1
10 TABLET, FILM COATED ORAL DAILY
COMMUNITY

## 2025-05-16 RX ORDER — PANTOPRAZOLE SODIUM 40 MG/1
40 TABLET, DELAYED RELEASE ORAL DAILY
COMMUNITY

## 2025-05-16 RX ORDER — VALSARTAN 160 MG/1
160 TABLET ORAL DAILY
COMMUNITY

## 2025-05-16 RX ORDER — FENOFIBRATE 48 MG/1
48 TABLET, FILM COATED ORAL DAILY
COMMUNITY

## 2025-05-16 RX ORDER — FUROSEMIDE 40 MG/1
40 TABLET ORAL 2 TIMES DAILY
COMMUNITY

## 2025-05-16 RX ORDER — EZETIMIBE 10 MG/1
10 TABLET ORAL DAILY
COMMUNITY

## 2025-05-16 RX ORDER — FLUTICASONE FUROATE, UMECLIDINIUM BROMIDE AND VILANTEROL TRIFENATATE 200; 62.5; 25 UG/1; UG/1; UG/1
1 POWDER RESPIRATORY (INHALATION) DAILY
COMMUNITY

## 2025-05-20 NOTE — PROGRESS NOTES
HISTORY & PHYSICAL  General Surgery    Patient Name: Joann López  YOB: 1955    Date: 2025                   SUBJECTIVE:     Chief Complaint/Reason for Admission:   Chief Complaint   Patient presents with    Consult     GERD        History of Present Illness:  Ms. Joann López is a 70 y.o. female who reports she has been experiencing severe reflux.  She states She has been having worsening reflux symptoms over the past years and now is not responsive to medication.  She is able to tolerate solids and liquids but sometimes experiences dysphagia with nausea and occasional emesis.  She denies any CP and complains of SOB.  Denies any fever / chills.  She denies any recent changes to voice and complains of chronic upper respiratory illnesses. She also denies of anemia.    Review of Systems:  12 point ROS negative except as stated in HPI    PAST HISTORY:     Past Medical History:   Diagnosis Date    Asthma     Environmental allergies     Erosive gastritis     Gastric ulcer     GERD (gastroesophageal reflux disease)     HLD (hyperlipidemia)     HTN (hypertension)     Irritable bowel syndrome      Past Surgical History:   Procedure Laterality Date    ANORECTAL MANOMETRY N/A 2019    Procedure: MANOMETRY, ANORECTAL;  Surgeon: Vivi Coburn MD;  Location: [address]);  Service: Endoscopy;  Laterality: N/A;    APPENDECTOMY       SECTION      COLONOSCOPY      CORONARY STENT PLACEMENT  2022    ESOPHAGOGASTRODUODENOSCOPY N/A 2019    Procedure: EGD (ESOPHAGOGASTRODUODENOSCOPY);  Surgeon: Vivi Coburn MD;  Location: [address]);  Service: Endoscopy;  Laterality: N/A;    HYSTERECTOMY      OOPHORECTOMY      PARTIAL HYSTERECTOMY      TONSILLECTOMY      10 yrs ago     Family History   Problem Relation Name Age of Onset    Celiac disease Mother      Irritable bowel syndrome Mother      Hypertension Father      Heart disease Father      Hypertension Maternal  Grandmother      Heart disease Maternal Grandmother      Heart disease Paternal Grandmother      Hypertension Paternal Grandmother      Colon cancer Neg Hx      Crohn's disease Neg Hx      Esophageal cancer Neg Hx      Rectal cancer Neg Hx      Stomach cancer Neg Hx      Ulcerative colitis Neg Hx       Social History     Socioeconomic History    Marital status:    Tobacco Use    Smoking status: Never    Smokeless tobacco: Never   Substance and Sexual Activity    Alcohol use: Yes     Comment: socially    Drug use: No       MEDICATIONS & ALLERGIES:     Current Outpatient Medications on File Prior to Visit   Medication Sig    amitriptyline (ELAVIL) 25 MG tablet TAKE ONE TABLET BY MOUTH AT BEDTIME AS DIRECTED    azelastine (ASTELIN) 137 mcg (0.1 %) nasal spray 1 spray by Nasal route 2 (two) times daily.    bisoprolol-hydrochlorothiazide (ZIAC) 10-6.25 mg per tablet Take 1 tablet by mouth once daily.     calcium carbonate (OS-LU) 600 mg calcium (1,500 mg) Tab Take 600 mg by mouth once.    citalopram (CELEXA) 20 MG tablet Take 20 mg by mouth once daily.    coQ10, ubiquinol, 200 mg Cap Take 200 mg by mouth.    evolocumab (REPATHA SURECLICK) 140 mg/mL PnIj Inject into the skin.    ezetimibe (ZETIA) 10 mg tablet Take 10 mg by mouth once daily.    fezolinetant (VEOZAH ORAL) Take by mouth.    fluticasone (FLONASE) 50 mcg/actuation nasal spray 1 spray by Nasal route.    fluticasone-umeclidin-vilanter (TRELEGY ELLIPTA) 200-62.5-25 mcg inhaler Inhale 1 puff into the lungs once daily.    furosemide (LASIX) 40 MG tablet Take 40 mg by mouth 2 (two) times daily.    mag carb/aluminum hydrox/algin (GAVISCON ORAL) Take by mouth.    montelukast (SINGULAIR) 10 mg tablet Take 10 mg by mouth.    pantoprazole (PROTONIX) 40 MG tablet Take 40 mg by mouth once daily.    prasugreL HCl (EFFIENT) 10 mg Tab Take 10 mg by mouth once daily.    simethicone (GAS-X ORAL) Take by mouth.    valsartan (DIOVAN) 160 MG tablet Take 160 mg by mouth  "once daily.    vitamin E 400 UNIT capsule Take 400 Units by mouth.    [DISCONTINUED] atorvastatin (LIPITOR) 40 MG tablet Take 40 mg by mouth once daily.    chlorpheniramine (CHLOR-TRIMETON) 4 mg tablet Take 4 mg by mouth every 6 (six) hours as needed for Allergies.    RABEprazole (ACIPHEX) 20 mg tablet Take 1 tablet (20 mg total) by mouth 2 (two) times daily.    [DISCONTINUED] fenofibrate (TRICOR) 48 MG tablet Take 48 mg by mouth once daily.    [DISCONTINUED] IBUPROFEN ORAL Take by mouth.    [DISCONTINUED] linaclotide (LINZESS) 72 mcg Cap Take 1 capsule (72 mcg total) by mouth once daily.    [DISCONTINUED] PREMARIN 0.625 mg tablet     [DISCONTINUED] rosuvastatin (CRESTOR) 20 MG tablet Take 20 mg by mouth once daily.     No current facility-administered medications on file prior to visit.     Review of patient's allergies indicates:   Allergen Reactions    Wheat containing prod        OBJECTIVE:     Vitals:    05/21/25 1336   BP: 135/72   Pulse: 66   Weight: 78.5 kg (173 lb)   Height: 5' 3" (1.6 m)     Body mass index is 30.65 kg/m².    Physical Exam:  General:  Well developed, well nourished, no acute distress  HEENT:  Normocephalic, atraumatic, PERRL, EOMI, clear sclera, ears normal, neck supple, throat clear without erythema or exudates  CVS:  RRR, S1 and S2 normal, no murmurs, rubs, gallops  Resp:  Lungs clear to auscultation, no wheezes, rales, rhonchi, cough  GI:  Abdomen soft, non-tender, non-distended, normoactive bowel sounds, no masses  :  Deferred  MSK:  No muscle atrophy, cyanosis, peripheral edema, full range of motion  Skin:  No rashes, ulcers, erythema  Neuro:  CNII-XII grossly intact  Psych:  Alert and oriented to person, place, and time    Results:  I have independently reviewed all pertinent lab (CBC, CMP) and radiologic studies (EGD, UGI, Manometry, CT) relevant to general/bariatric surgery and have discussed my interpretations with the patient.    Esophageal Manometry 4/15/25  Ineffective " esophageal motility disorder    24 Hour pH with Impedance Digitrapper 4/15/25  Alkaline Reflux Disease    EGD 8/31/22  Normal mucosa in the whole esophagus.  Mild erythema and erosions in the antrum compatible with erosive gastritis.    Normal mucosa in the whole examined duodenum.    EGD 8/4/21  Normal mucosa in the whole esophagus.  No changes suggestive of esophagitis and/or Amin's Esophagus in the esophagus.    No hiatal hernia noted upon forward or retroflexion of endoscope.  Erythema and erosions in the antrum compatible with erosive gastritis.    Normal mucosa in the whole examined duodenum.    Esophageal manometry 1/25/17  There is frequent failed peristalsis with very large breaks in the peristalsis.  In several swallows there appears to be minimal if any peristaltic activity.    EGD 1/20/17  Normal mucosa in the whole esophagus.  No esophageal rings, strictures and/or webs were seen to account for patient's dysphagia.    Erythema in the antrum compatible with acute gastritis.  Normal mucosa in the whole examined duodenum.    EGD 5/21/15  Erosions in the antrum compatible with erosive gastritis.  Normal mucosa in the whole esophagus.  Normal mucosa in the whole esophagus.    VISIT DIAGNOSES:       ICD-10-CM ICD-9-CM   1. Preop examination  Z01.818 V72.84   2. Gastroesophageal reflux disease, unspecified whether esophagitis present  K21.9 530.81   3. GERD (gastroesophageal reflux disease)  K21.9 530.81       ASSESSMENT/PLAN:     Joann López is a 70 y.o. She with refractory GERD with hiatal hernia    -  I have discussed with the patient the pertinent findings and surgical options.  She was able to perform mannometry testing.  I discussed with her pending on her anatomy and physiology with this information there is a small chance that surgery could worsen the motility of her esophagus with a fundoplication.  I feel that with a loose wrap / partial or gastropexy if needed She would be able to tolerate a  diet with the knowledge that She may need a revision if it appears too tight.  Risks and Benefits of surgical options were discussed with the patient and She voiced understanding and wishes to proceed.      -  Plan for Laparosopic Fundoplication /Hiatal Hernia Repair / Gastropexy

## 2025-05-21 ENCOUNTER — TELEPHONE (OUTPATIENT)
Dept: SURGERY | Facility: CLINIC | Age: 70
End: 2025-05-21

## 2025-05-21 ENCOUNTER — OFFICE VISIT (OUTPATIENT)
Dept: SURGERY | Facility: CLINIC | Age: 70
End: 2025-05-21
Payer: MEDICARE

## 2025-05-21 VITALS
SYSTOLIC BLOOD PRESSURE: 135 MMHG | WEIGHT: 173 LBS | HEIGHT: 63 IN | HEART RATE: 66 BPM | BODY MASS INDEX: 30.65 KG/M2 | DIASTOLIC BLOOD PRESSURE: 72 MMHG

## 2025-05-21 DIAGNOSIS — K21.9 GERD (GASTROESOPHAGEAL REFLUX DISEASE): ICD-10-CM

## 2025-05-21 DIAGNOSIS — K21.9 GASTROESOPHAGEAL REFLUX DISEASE, UNSPECIFIED WHETHER ESOPHAGITIS PRESENT: ICD-10-CM

## 2025-05-21 DIAGNOSIS — Z01.818 PREOP EXAMINATION: Primary | ICD-10-CM

## 2025-05-21 PROCEDURE — 3075F SYST BP GE 130 - 139MM HG: CPT | Mod: CPTII,,, | Performed by: SURGERY

## 2025-05-21 PROCEDURE — 3078F DIAST BP <80 MM HG: CPT | Mod: CPTII,,, | Performed by: SURGERY

## 2025-05-21 PROCEDURE — 3288F FALL RISK ASSESSMENT DOCD: CPT | Mod: CPTII,,, | Performed by: SURGERY

## 2025-05-21 PROCEDURE — 4010F ACE/ARB THERAPY RXD/TAKEN: CPT | Mod: CPTII,,, | Performed by: SURGERY

## 2025-05-21 PROCEDURE — 1159F MED LIST DOCD IN RCRD: CPT | Mod: CPTII,,, | Performed by: SURGERY

## 2025-05-21 PROCEDURE — 3008F BODY MASS INDEX DOCD: CPT | Mod: CPTII,,, | Performed by: SURGERY

## 2025-05-21 PROCEDURE — 1101F PT FALLS ASSESS-DOCD LE1/YR: CPT | Mod: CPTII,,, | Performed by: SURGERY

## 2025-05-21 PROCEDURE — 99204 OFFICE O/P NEW MOD 45 MIN: CPT | Mod: ,,, | Performed by: SURGERY

## 2025-05-21 NOTE — TELEPHONE ENCOUNTER
Spoke to patient during clinic appointment regarding holding Effient for upcoming procedure.  Verbalized understanding.

## 2025-05-22 ENCOUNTER — DOCUMENTATION ONLY (OUTPATIENT)
Dept: SURGERY | Facility: CLINIC | Age: 70
End: 2025-05-22
Payer: MEDICARE

## 2025-05-22 DIAGNOSIS — K21.9 GASTROESOPHAGEAL REFLUX DISEASE, UNSPECIFIED WHETHER ESOPHAGITIS PRESENT: Primary | ICD-10-CM

## 2025-06-24 ENCOUNTER — TELEPHONE (OUTPATIENT)
Dept: SURGERY | Facility: CLINIC | Age: 70
End: 2025-06-24
Payer: MEDICARE

## 2025-06-24 NOTE — TELEPHONE ENCOUNTER
Patient called requesting to reschedule surgery date.  Picked 8/12/25.  Instructions reviewed.  Verbalized understanding.

## 2025-07-08 ENCOUNTER — TELEPHONE (OUTPATIENT)
Dept: SURGERY | Facility: CLINIC | Age: 70
End: 2025-07-08
Payer: MEDICARE

## 2025-07-08 NOTE — TELEPHONE ENCOUNTER
Patient called confirming desire to cancel surgery.  States will call us if/when she is ready to reschedule.

## 2025-08-07 DIAGNOSIS — R10.11 ABDOMINAL PAIN, RUQ: Primary | ICD-10-CM

## 2025-08-07 DIAGNOSIS — R11.0 NAUSEA: ICD-10-CM

## 2025-08-07 DIAGNOSIS — K21.9 GERD (GASTROESOPHAGEAL REFLUX DISEASE): ICD-10-CM

## 2025-08-07 DIAGNOSIS — R14.0 ABDOMINAL BLOATING: ICD-10-CM

## 2025-08-19 ENCOUNTER — HOSPITAL ENCOUNTER (OUTPATIENT)
Dept: RADIOLOGY | Facility: HOSPITAL | Age: 70
Discharge: HOME | End: 2025-08-19
Attending: INTERNAL MEDICINE
Payer: MEDICARE

## 2025-08-19 VITALS — BODY MASS INDEX: 30.65 KG/M2 | WEIGHT: 173 LBS

## 2025-08-19 DIAGNOSIS — R10.11 ABDOMINAL PAIN, RUQ: ICD-10-CM

## 2025-08-19 DIAGNOSIS — K21.9 GERD (GASTROESOPHAGEAL REFLUX DISEASE): ICD-10-CM

## 2025-08-19 DIAGNOSIS — R14.0 ABDOMINAL BLOATING: ICD-10-CM

## 2025-08-19 DIAGNOSIS — R11.0 NAUSEA: ICD-10-CM

## 2025-08-19 PROCEDURE — 78226 HEPATOBILIARY SYSTEM IMAGING: CPT | Mod: TC

## 2025-08-19 PROCEDURE — 63600175 PHARM REV CODE 636 W HCPCS: Performed by: INTERNAL MEDICINE

## 2025-08-19 PROCEDURE — A9537 TC99M MEBROFENIN: HCPCS | Performed by: INTERNAL MEDICINE

## 2025-08-19 PROCEDURE — 76700 US EXAM ABDOM COMPLETE: CPT | Mod: TC

## 2025-08-19 RX ORDER — SINCALIDE 5 UG/5ML
0.02 INJECTION, POWDER, LYOPHILIZED, FOR SOLUTION INTRAVENOUS ONCE
Status: COMPLETED | OUTPATIENT
Start: 2025-08-19 | End: 2025-08-19

## 2025-08-19 RX ORDER — KIT FOR THE PREPARATION OF TECHNETIUM TC 99M MEBROFENIN 45 MG/10ML
8 INJECTION, POWDER, LYOPHILIZED, FOR SOLUTION INTRAVENOUS
Status: COMPLETED | OUTPATIENT
Start: 2025-08-19 | End: 2025-08-19

## 2025-08-19 RX ADMIN — MEBROFENIN 8.2 MILLICURIE: 45 INJECTION, POWDER, LYOPHILIZED, FOR SOLUTION INTRAVENOUS at 09:08

## 2025-08-19 RX ADMIN — SINCALIDE 1.6 MCG: 5 INJECTION, POWDER, LYOPHILIZED, FOR SOLUTION INTRAVENOUS at 09:08
